# Patient Record
Sex: FEMALE | Race: BLACK OR AFRICAN AMERICAN | NOT HISPANIC OR LATINO | Employment: OTHER | URBAN - METROPOLITAN AREA
[De-identification: names, ages, dates, MRNs, and addresses within clinical notes are randomized per-mention and may not be internally consistent; named-entity substitution may affect disease eponyms.]

---

## 2018-03-20 ENCOUNTER — OFFICE VISIT (OUTPATIENT)
Dept: NEUROLOGY | Facility: CLINIC | Age: 73
End: 2018-03-20
Payer: MEDICARE

## 2018-03-20 ENCOUNTER — LAB VISIT (OUTPATIENT)
Dept: LAB | Facility: OTHER | Age: 73
End: 2018-03-20
Attending: PSYCHIATRY & NEUROLOGY
Payer: MEDICARE

## 2018-03-20 VITALS
SYSTOLIC BLOOD PRESSURE: 151 MMHG | WEIGHT: 136.88 LBS | BODY MASS INDEX: 21.48 KG/M2 | HEIGHT: 67 IN | DIASTOLIC BLOOD PRESSURE: 80 MMHG | HEART RATE: 74 BPM

## 2018-03-20 DIAGNOSIS — I10 ESSENTIAL HYPERTENSION: ICD-10-CM

## 2018-03-20 DIAGNOSIS — E78.5 HYPERLIPIDEMIA, UNSPECIFIED HYPERLIPIDEMIA TYPE: ICD-10-CM

## 2018-03-20 DIAGNOSIS — E07.9 THYROID DISEASE: ICD-10-CM

## 2018-03-20 DIAGNOSIS — R41.3 MEMORY LOSS: ICD-10-CM

## 2018-03-20 DIAGNOSIS — R41.3 MEMORY LOSS: Primary | ICD-10-CM

## 2018-03-20 DIAGNOSIS — F41.8 DEPRESSION WITH ANXIETY: ICD-10-CM

## 2018-03-20 LAB
FOLATE SERPL-MCNC: 13.2 NG/ML
RPR SER QL: NORMAL
VIT B12 SERPL-MCNC: 388 PG/ML

## 2018-03-20 PROCEDURE — 82607 VITAMIN B-12: CPT

## 2018-03-20 PROCEDURE — 3079F DIAST BP 80-89 MM HG: CPT | Mod: CPTII,S$GLB,, | Performed by: PSYCHIATRY & NEUROLOGY

## 2018-03-20 PROCEDURE — 3077F SYST BP >= 140 MM HG: CPT | Mod: CPTII,S$GLB,, | Performed by: PSYCHIATRY & NEUROLOGY

## 2018-03-20 PROCEDURE — 36415 COLL VENOUS BLD VENIPUNCTURE: CPT

## 2018-03-20 PROCEDURE — 99204 OFFICE O/P NEW MOD 45 MIN: CPT | Mod: S$GLB,,, | Performed by: PSYCHIATRY & NEUROLOGY

## 2018-03-20 PROCEDURE — 86592 SYPHILIS TEST NON-TREP QUAL: CPT

## 2018-03-20 PROCEDURE — 99999 PR PBB SHADOW E&M-EST. PATIENT-LVL IV: CPT | Mod: PBBFAC,,, | Performed by: PSYCHIATRY & NEUROLOGY

## 2018-03-20 PROCEDURE — 82746 ASSAY OF FOLIC ACID SERUM: CPT

## 2018-03-20 RX ORDER — ATORVASTATIN CALCIUM 10 MG/1
40 TABLET, FILM COATED ORAL
Status: ON HOLD | COMMUNITY
Start: 2018-01-26 | End: 2019-06-26 | Stop reason: HOSPADM

## 2018-03-20 RX ORDER — ERGOCALCIFEROL 1.25 MG/1
50000 CAPSULE ORAL
COMMUNITY
End: 2023-04-13

## 2018-03-20 RX ORDER — OMEPRAZOLE 40 MG/1
40 CAPSULE, DELAYED RELEASE ORAL DAILY
COMMUNITY
End: 2023-04-13

## 2018-03-20 RX ORDER — NAPROXEN 375 MG/1
TABLET ORAL
Status: ON HOLD | COMMUNITY
Start: 2018-01-26 | End: 2019-06-26 | Stop reason: HOSPADM

## 2018-03-20 RX ORDER — ASPIRIN 81 MG/1
81 TABLET ORAL DAILY
COMMUNITY

## 2018-03-20 RX ORDER — OMEGA-3 FATTY ACIDS 1000 MG
2 CAPSULE ORAL DAILY
COMMUNITY

## 2018-03-20 NOTE — LETTER
March 20, 2018      Velia Molina MD  [address]  [facility] [address]           [facility] - Neurology  [address]  [facility] [address]  Phone: [phone]  Fax: [phone]          Patient: Elda Cain   MR Number: [id number]   YOB: 1945   Date of Visit: 3/20/2018       Dear Dr. Velia Molina:    Thank you for referring Elda Cain to me for evaluation. Attached you will find relevant portions of my assessment and plan of care.    If you have questions, please do not hesitate to call me. I look forward to following Elda Cain along with you.    Sincerely,    Chidi Soriano MD    Enclosure  CC:  No Recipients    If you would like to receive this communication electronically, please contact [email or web address] or [phone] to request more information on [employer] access.    For providers and/or their staff who would like to refer a patient to [facility], please contact us through our one-stop-shop provider referral line, [facility], at [phone].    If you feel you have received this communication in error or would no longer like to receive these types of communications, please e-mail [email or web address]

## 2018-03-20 NOTE — PATIENT INSTRUCTIONS
Discussed with patient.  Her memory difficulties may be related to inattention however given her vascular risk factors of hypertension and hyperlipidemia the possibility of mild cognitive impairment on a vascular basis needs also to be considered.  Other contributing factors would include the history of depression and hypothyroidism.  Will get a B12/folate/RPR, MRI scan of the brain, noncontrast and neuropsychological testing.  Patient will follow-up after the testing is completed.

## 2018-03-20 NOTE — PROGRESS NOTES
Subjective:       Patient ID: Elda Cain is a 72 y.o. female.    Chief Complaint:  Memory Loss      History of Present Illness  HPI   This is a 72-year-old -American female who was referred for evaluation of intermittent memory difficulties.  The patient reports that over the past 6 months she has had occasional difficulty with recall of recent information, has become absent-minded and that she has left the refrigerator door open and gone into another room, had left her purse on the porch and entered the house without it.  She has occasional difficulty with word finding however does have delayed recall.  She does have history of anxiety reporting that she is a worrier and has occasional sleep difficulties.  She is presently on Lexapro for this.  She does not drive reporting that she used to be quite anxious when she used to drive when she was younger and her  did all the driving.  She is now a  and has not driven since her  .  She has no difficulty taking public transportation and her son helps her out with keeping appointments, when he is available.  She also reports that she has difficulty with multitasking and if she is on the phone and if interrupted loses track of what she was talking about.  She is otherwise no difficulty with her day-to-day activities including doing the groceries and managing her medications and finances.  She came to the clinic alone.  She has continued to work at [employer] as a .  She was concerned about her memory as her mother is in a nursing home in [place] with history of dementia.  She is in her 90s.  The patient denies any headaches, visual difficulties or hearing impairment.  She has had no history of any significant head trauma.  Vascular risk factors include hypertension and hyperlipidemia.  Her blood pressure medication was recently increased for better control.  Medical records from his primary care physician including labs were available  for review.  These are scanned into [employer].       Review of Systems  Review of Systems   Constitutional: Negative.    HENT: Negative.  Negative for hearing loss.    Eyes: Negative.  Negative for visual disturbance.   Respiratory: Negative.  Negative for shortness of breath.    Cardiovascular: Negative.  Negative for chest pain and palpitations.   Gastrointestinal: Negative.    Genitourinary: Negative.    Musculoskeletal: Negative.  Negative for back pain, gait problem and neck pain.   Skin: Negative.    Neurological: Negative.  Negative for tremors, seizures, syncope, speech difficulty, weakness, numbness and headaches.   Psychiatric/Behavioral: Positive for decreased concentration. Negative for confusion. The patient is nervous/anxious.        Objective:      Neurologic Exam     Mental Status   Oriented to person, place, and time.   Registration: recalls 3 of 3 objects. Recall at 5 minutes: recalls 2 of 3 objects. Follows 3 step commands.   Attention: normal. Concentration: normal.   Speech: speech is normal   Level of consciousness: alert  Knowledge: good.   Able to name object. Able to read. Able to repeat. Able to write. Normal comprehension.     Cranial Nerves   Cranial nerves II through XII intact.     Motor Exam   Muscle bulk: normal  Overall muscle tone: normal    Strength   Strength 5/5 throughout.     Sensory Exam   Light touch normal.   Proprioception normal.   Pinprick normal.     Gait, Coordination, and Reflexes     Gait  Gait: normal    Coordination   Romberg: negative  Finger to nose coordination: normal    Tremor   Resting tremor: absent  Intention tremor: absent  Action tremor: absent    Reflexes   Right brachioradialis: 1+  Left brachioradialis: 1+  Right biceps: 1+  Left biceps: 1+  Right triceps: 1+  Left triceps: 1+  Right patellar: 1+  Left patellar: 1+  Right achilles: 1+  Left achilles: 1+  Right plantar: normal  Left plantar: normal      Physical Exam   Constitutional: She is oriented to  person, place, and time. She appears well-developed and well-nourished.   HENT:   Head: Normocephalic and atraumatic.   Neck: Normal range of motion. Neck supple. Carotid bruit is not present.   Neurological: She is oriented to person, place, and time. She has normal strength. She has a normal Finger-Nose-Finger Test and a normal Romberg Test. Gait normal.   Reflex Scores:       Tricep reflexes are 1+ on the right side and 1+ on the left side.       Bicep reflexes are 1+ on the right side and 1+ on the left side.       Brachioradialis reflexes are 1+ on the right side and 1+ on the left side.       Patellar reflexes are 1+ on the right side and 1+ on the left side.       Achilles reflexes are 1+ on the right side and 1+ on the left side.  Psychiatric: Her speech is normal.   Vitals reviewed.        Assessment:        1. Memory loss    2. Essential hypertension    3. Hyperlipidemia, unspecified hyperlipidemia type    4. Thyroid disease    5. Depression with anxiety            Plan:       Discussed with patient.  Her memory difficulties may be related to inattention however given her vascular risk factors of hypertension and hyperlipidemia the possibility of mild cognitive impairment on a vascular basis needs also to be considered.  Other contributing factors would include the history of depression and hypothyroidism.  Will get a B12/folate/RPR, MRI scan of the brain, noncontrast and neuropsychological testing.  Patient will follow-up after the testing is completed.

## 2018-03-21 ENCOUNTER — HOSPITAL ENCOUNTER (OUTPATIENT)
Dept: RADIOLOGY | Facility: OTHER | Age: 73
Discharge: HOME | End: 2018-03-21
Attending: PSYCHIATRY & NEUROLOGY
Payer: MEDICARE

## 2018-03-21 DIAGNOSIS — E78.5 HYPERLIPIDEMIA, UNSPECIFIED HYPERLIPIDEMIA TYPE: ICD-10-CM

## 2018-03-21 DIAGNOSIS — R41.3 MEMORY LOSS: ICD-10-CM

## 2018-03-21 DIAGNOSIS — E07.9 THYROID DISEASE: ICD-10-CM

## 2018-03-21 DIAGNOSIS — I10 ESSENTIAL HYPERTENSION: ICD-10-CM

## 2018-03-21 PROCEDURE — 70551 MRI BRAIN STEM W/O DYE: CPT | Mod: 26,,, | Performed by: RADIOLOGY

## 2018-03-21 PROCEDURE — 70551 MRI BRAIN STEM W/O DYE: CPT | Mod: TC

## 2019-03-20 ENCOUNTER — OFFICE VISIT (OUTPATIENT)
Dept: URGENT CARE | Facility: CLINIC | Age: 74
End: 2019-03-20
Payer: MEDICARE

## 2019-03-20 VITALS
HEIGHT: 65 IN | HEART RATE: 82 BPM | TEMPERATURE: 98 F | BODY MASS INDEX: 22.33 KG/M2 | DIASTOLIC BLOOD PRESSURE: 78 MMHG | WEIGHT: 134 LBS | OXYGEN SATURATION: 97 % | SYSTOLIC BLOOD PRESSURE: 138 MMHG | RESPIRATION RATE: 20 BRPM

## 2019-03-20 DIAGNOSIS — J02.9 SORE THROAT: Primary | ICD-10-CM

## 2019-03-20 LAB
CTP QC/QA: YES
GLUCOSE SERPL-MCNC: 104 MG/DL (ref 70–110)
POC ANION GAP: 9 MMOL/L (ref 10–20)
POC BUN: 15 MMOL/L (ref 8–26)
POC CHLORIDE: 103 MMOL/L (ref 98–109)
POC CREATININE: 1.2 MG/DL (ref 0.6–1.3)
POC HEMATOCRIT: 39 %PCV (ref 37–47)
POC HEMOGLOBIN: 13.3 G/DL (ref 12.5–16)
POC ICA: 1.09 MMOL/L (ref 1.12–1.32)
POC POTASSIUM: 4 MMOL/L (ref 3.5–4.9)
POC SODIUM: 140 MMOL/L (ref 138–146)
POC TCO2: 33 MMOL/L (ref 24–29)
S PYO RRNA THROAT QL PROBE: POSITIVE

## 2019-03-20 PROCEDURE — 1101F PR PT FALLS ASSESS DOC 0-1 FALLS W/OUT INJ PAST YR: ICD-10-PCS | Mod: CPTII,S$GLB,, | Performed by: PHYSICIAN ASSISTANT

## 2019-03-20 PROCEDURE — 80047 POCT CHEMISTRY PANEL: ICD-10-PCS | Mod: QW,S$GLB,, | Performed by: PHYSICIAN ASSISTANT

## 2019-03-20 PROCEDURE — 3075F SYST BP GE 130 - 139MM HG: CPT | Mod: CPTII,S$GLB,, | Performed by: PHYSICIAN ASSISTANT

## 2019-03-20 PROCEDURE — 96372 PR INJECTION,THERAP/PROPH/DIAG2ST, IM OR SUBCUT: ICD-10-PCS | Mod: S$GLB,,, | Performed by: PHYSICIAN ASSISTANT

## 2019-03-20 PROCEDURE — 96372 THER/PROPH/DIAG INJ SC/IM: CPT | Mod: S$GLB,,, | Performed by: PHYSICIAN ASSISTANT

## 2019-03-20 PROCEDURE — 1101F PT FALLS ASSESS-DOCD LE1/YR: CPT | Mod: CPTII,S$GLB,, | Performed by: PHYSICIAN ASSISTANT

## 2019-03-20 PROCEDURE — 99214 OFFICE O/P EST MOD 30 MIN: CPT | Mod: 25,S$GLB,, | Performed by: PHYSICIAN ASSISTANT

## 2019-03-20 PROCEDURE — 3078F PR MOST RECENT DIASTOLIC BLOOD PRESSURE < 80 MM HG: ICD-10-PCS | Mod: CPTII,S$GLB,, | Performed by: PHYSICIAN ASSISTANT

## 2019-03-20 PROCEDURE — 3075F PR MOST RECENT SYSTOLIC BLOOD PRESS GE 130-139MM HG: ICD-10-PCS | Mod: CPTII,S$GLB,, | Performed by: PHYSICIAN ASSISTANT

## 2019-03-20 PROCEDURE — 87880 STREP A ASSAY W/OPTIC: CPT | Mod: QW,S$GLB,, | Performed by: PHYSICIAN ASSISTANT

## 2019-03-20 PROCEDURE — 99214 PR OFFICE/OUTPT VISIT, EST, LEVL IV, 30-39 MIN: ICD-10-PCS | Mod: 25,S$GLB,, | Performed by: PHYSICIAN ASSISTANT

## 2019-03-20 PROCEDURE — 80047 BASIC METABLC PNL IONIZED CA: CPT | Mod: QW,S$GLB,, | Performed by: PHYSICIAN ASSISTANT

## 2019-03-20 PROCEDURE — 3078F DIAST BP <80 MM HG: CPT | Mod: CPTII,S$GLB,, | Performed by: PHYSICIAN ASSISTANT

## 2019-03-20 PROCEDURE — 87880 POCT RAPID STREP A: ICD-10-PCS | Mod: QW,S$GLB,, | Performed by: PHYSICIAN ASSISTANT

## 2019-03-20 RX ORDER — AZELASTINE 1 MG/ML
2 SPRAY, METERED NASAL 2 TIMES DAILY
Qty: 30 ML | Refills: 0 | Status: SHIPPED | OUTPATIENT
Start: 2019-03-20 | End: 2023-04-13

## 2019-03-20 RX ORDER — AMOXICILLIN 500 MG/1
500 CAPSULE ORAL 2 TIMES DAILY
Qty: 20 CAPSULE | Refills: 0 | Status: SHIPPED | OUTPATIENT
Start: 2019-03-20 | End: 2019-03-30

## 2019-03-20 RX ORDER — DEXAMETHASONE SODIUM PHOSPHATE 100 MG/10ML
5 INJECTION INTRAMUSCULAR; INTRAVENOUS ONCE
Status: COMPLETED | OUTPATIENT
Start: 2019-03-20 | End: 2019-03-20

## 2019-03-20 RX ADMIN — DEXAMETHASONE SODIUM PHOSPHATE 5 MG: 100 INJECTION INTRAMUSCULAR; INTRAVENOUS at 05:03

## 2019-03-20 NOTE — PROGRESS NOTES
"Subjective:       Patient ID: Elda Cain is a 73 y.o. female.    Vitals:  height is 5' 4.5" (1.638 m) and weight is 60.8 kg (134 lb). Her oral temperature is 98 °F (36.7 °C). Her blood pressure is 138/78 and her pulse is 82. Her respiration is 20 and oxygen saturation is 97%.     Chief Complaint: Sore Throat and Neck Pain    Sore Throat    This is a new problem. The current episode started in the past 7 days. The problem has been unchanged. The pain is worse on the left side. There has been no fever. The fever has been present for less than 1 day. The pain is at a severity of 8/10. The pain is severe. Associated symptoms include ear pain and swollen glands. Pertinent negatives include no congestion, coughing, shortness of breath, stridor or vomiting. She has had exposure to strep. Exposure to: Grandson. She has tried NSAIDs (Clartin 24hr) for the symptoms. The treatment provided no relief.       Constitution: Negative for chills and sweating.   HENT: Positive for ear pain and sore throat. Negative for congestion, sinus pain, sinus pressure and voice change.    Neck: Positive for painful lymph nodes.   Eyes: Negative for eye redness.   Respiratory: Negative for chest tightness, cough, sputum production, bloody sputum, COPD, shortness of breath, stridor, wheezing and asthma.    Gastrointestinal: Negative for nausea and vomiting.   Musculoskeletal: Negative for muscle ache.   Skin: Negative for rash.   Allergic/Immunologic: Positive for seasonal allergies. Negative for asthma.   Hematologic/Lymphatic: Positive for swollen lymph nodes.       Objective:      Physical Exam   Constitutional: She is oriented to person, place, and time. She appears well-developed and well-nourished. She is cooperative.  Non-toxic appearance. She does not appear ill. No distress.   HENT:   Head: Normocephalic and atraumatic.   Right Ear: Hearing, tympanic membrane, external ear and ear canal normal.   Left Ear: Hearing, tympanic " membrane, external ear and ear canal normal.   Nose: Nose normal. No mucosal edema, rhinorrhea or nasal deformity. No epistaxis. Right sinus exhibits no maxillary sinus tenderness and no frontal sinus tenderness. Left sinus exhibits no maxillary sinus tenderness and no frontal sinus tenderness.   Mouth/Throat: Uvula is midline and mucous membranes are normal. No trismus in the jaw. Normal dentition. No uvula swelling. No posterior oropharyngeal erythema.   Posterior or pharyngeal erythema.  No significant tonsillar swelling or asymmetry. No uvular deviation.  Airway patent without stridor.  There is tender bilateral anterior cervical lymphadenopathy.  Neck remains supple. Normal phonation.   Eyes: Conjunctivae and lids are normal. Right eye exhibits no discharge. Left eye exhibits no discharge. No scleral icterus.   Sclera clear bilat   Neck: Trachea normal, normal range of motion, full passive range of motion without pain and phonation normal. Neck supple.   Cardiovascular: Normal rate, regular rhythm, normal heart sounds, intact distal pulses and normal pulses.   Pulmonary/Chest: Effort normal and breath sounds normal. No respiratory distress.   Abdominal: Soft. Normal appearance and bowel sounds are normal. She exhibits no distension, no pulsatile midline mass and no mass. There is no tenderness.   Musculoskeletal: Normal range of motion. She exhibits no edema or deformity.   Neurological: She is alert and oriented to person, place, and time. She exhibits normal muscle tone. Coordination normal.   Skin: Skin is warm, dry and intact. She is not diaphoretic. No pallor.   Psychiatric: She has a normal mood and affect. Her speech is normal and behavior is normal. Judgment and thought content normal. Cognition and memory are normal.   Nursing note and vitals reviewed.      Assessment:       1. Sore throat        Plan:         Strep pharyngitis.  Ensure normal kidney function.  Amoxicillin on D/C. GFR 56.7.    Sore  throat  -     POCT rapid strep A  -     POCT Chemistry Panel    Other orders  -     dexamethasone injection 5 mg  -     amoxicillin (AMOXIL) 500 MG capsule; Take 1 capsule (500 mg total) by mouth 2 (two) times daily. for 10 days  Dispense: 20 capsule; Refill: 0  -     diphenhydrAMINE-aluminum-magnesium hydroxide-simethicone-lidocaine HCl 2%; Swish and spit 10 mLs every 4 (four) hours as needed (throat pain).  Dispense: 120 mL; Refill: 0  -     azelastine (ASTELIN) 137 mcg (0.1 %) nasal spray; 2 sprays (274 mcg total) by Nasal route 2 (two) times daily.  Dispense: 30 mL; Refill: 0

## 2019-03-20 NOTE — PATIENT INSTRUCTIONS
Drink lots of fluids, stay well hydrated. Tylenol/Ibuprofen as needed for discomfort; go back and forth between these two medications as needed for temp greater than 100.4F. Zyrtec daily. Astelin for congestion. Take all antibiotics as prescribed. Try to take with meals to limit nausea.   Follow-up with primary care provider for reevaluation, further recommendations. Return to this ED if unable to treat fever, if symptoms persist or worsen despite treatment, if you begin with shortness of breath or difficulty breathing, if any other problems occur.   Self-Care for Sore Throats    Sore throats happen for many reasons, such as colds, allergies, and infections caused by viruses or bacteria. In any case, your throat becomes red and sore. Your goal for self-care is to reduce your discomfort while giving your throat a chance to heal.  Moisten and soothe your throat  Tips include the following:  · Try a sip of water first thing after waking up.  · Keep your throat moist by drinking 6 or more glasses of clear liquids every day.  · Run a cool-air humidifier in your room overnight.  · Avoid cigarette smoke.   · Suck on throat lozenges, cough drops, hard candy, ice chips, or frozen fruit-juice bars. Use the sugar-free versions if your diet or medical condition requires them.  Gargle to ease irritation  Gargling every hour or 2 can ease irritation. Try gargling with 1 of these solutions:  · 1/4 teaspoon of salt in 1/2 cup of warm water  · An over-the-counter anesthetic gargle  Use medicine for more relief  Over-the-counter medicine can reduce sore throat symptoms. Ask your pharmacist if you have questions about which medicine to use:  · Ease pain with anesthetic sprays. Aspirin or an aspirin substitute also helps. Remember, never give aspirin to anyone 18 or younger, or if you are already taking blood thinners.   · For sore throats caused by allergies, try antihistamines to block the allergic reaction.  · Remember: unless a  sore throat is caused by a bacterial infection, antibiotics wont help you.  Prevent future sore throats  Prevention tips include the following:  · Stop smoking or reduce contact with secondhand smoke. Smoke irritates the tender throat lining.  · Limit contact with pets and with allergy-causing substances, such as pollen and mold.  · When youre around someone with a sore throat or cold, wash your hands often to keep viruses or bacteria from spreading.  · Dont strain your vocal cords.  Call your healthcare provider  Contact your healthcare provider if you have:  · A temperature over 101°F (38.3°C)  · White spots on the throat  · Great difficulty swallowing  · Trouble breathing  · A skin rash  · Recent exposure to someone else with strep bacteria  · Severe hoarseness and swollen glands in the neck or jaw   Date Last Reviewed: 8/1/2016  © 9406-6789 [employer]. [address] [place] [address]. All rights reserved. This information is not intended as a substitute for professional medical care. Always follow your healthcare professional's instructions.        Viral Upper Respiratory Illness (Adult)  You have a viral upper respiratory illness (URI), which is another term for the common cold. This illness is contagious during the first few days. It is spread through the air by coughing and sneezing. It may also be spread by direct contact (touching the sick person and then touching your own eyes, nose, or mouth). Frequent handwashing will decrease risk of spread. Most viral illnesses go away within 7 to 10 days with rest and simple home remedies. Sometimes the illness may last for several weeks. Antibiotics will not kill a virus, and they are generally not prescribed for this condition.    Home care  · If symptoms are severe, rest at home for the first 2 to 3 days. When you resume activity, don't let yourself get too tired.  · Avoid being exposed to cigarette smoke (yours or others).  · You may  use acetaminophen or ibuprofen to control pain and fever, unless another medicine was prescribed. (Note: If you have chronic liver or kidney disease, have ever had a stomach ulcer or gastrointestinal bleeding, or are taking blood-thinning medicines, talk with your healthcare provider before using these medicines.) Aspirin should never be given to anyone under 18 years of age who is ill with a viral infection or fever. It may cause severe liver or brain damage.  · Your appetite may be poor, so a light diet is fine. Avoid dehydration by drinking 6 to 8 glasses of fluids per day (water, soft drinks, juices, tea, or soup). Extra fluids will help loosen secretions in the nose and lungs.  · Over-the-counter cold medicines will not shorten the length of time youre sick, but they may be helpful for the following symptoms: cough, sore throat, and nasal and sinus congestion. (Note: Do not use decongestants if you have high blood pressure.)  Follow-up care  Follow up with your healthcare provider, or as advised.  When to seek medical advice  Call your healthcare provider right away if any of these occur:  · Cough with lots of colored sputum (mucus)  · Severe headache; face, neck, or ear pain  · Difficulty swallowing due to throat pain  · Fever of 100.4°F (38°C)  Call 911, or get immediate medical care  Call emergency services right away if any of these occur:  · Chest pain, shortness of breath, wheezing, or difficulty breathing  · Coughing up blood  · Inability to swallow due to throat pain  Date Last Reviewed: 9/13/2015  © 1858-9070 [employer]. [address] [place] [address], [place] [address]. All rights reserved. This information is not intended as a substitute for professional medical care. Always follow your healthcare professional's instructions.        Pharyngitis: Strep (Confirmed)    You have had a positive test for strep throat. Strep throat is a contagious illness. It is spread by coughing, kissing or by  touching others after touching your mouth or nose. Symptoms include throat pain that is worse with swallowing, aching all over, headache, and fever. It is treated with antibiotic medicine. This should help you start to feel better in 1 to 2 days.  Home care  · Rest at home. Drink plenty of fluids to you won't get dehydrated.  · No work or school for the first 2 days of taking the antibiotics. After this time, you will not be contagious. You can then return to school or work if you are feeling better.   · Take antibiotic medicine for the full 10 days, even if you feel better. This is very important to ensure the infection is treated. It is also important to prevent medicine-resistant germs from developing. If you were given an antibiotic shot, you don't need any more antibiotics.  · You may use acetaminophen or ibuprofen to control pain or fever, unless another medicine was prescribed for this. Talk with your doctor before taking these medicines if you have chronic liver or kidney disease. Also talk with your doctor if you have had a stomach ulcer or GI bleeding.  · Throat lozenges or sprays help reduce pain. Gargling with warm saltwater will also reduce throat pain. Dissolve 1/2 teaspoon of salt in 1 glass of warm water. This may be useful just before meals.   · Soft foods are OK. Avoid salty or spicy foods.  Follow-up care  Follow up with your healthcare provider or our staff if you don't get better over the next week.  When to seek medical advice  Call your healthcare provider right away if any of these occur:  · Fever of 100.4ºF (38ºC) or higher, or as directed by your healthcare provider  · New or worsening ear pain, sinus pain, or headache  · Painful lumps in the back of neck  · Stiff neck  · Lymph nodes getting larger or becoming soft in the middle  · You can't swallow liquids or you can't open your mouth wide because of throat pain  · Signs of dehydration. These include very dark urine or no urine, sunken  eyes, and dizziness.  · Trouble breathing or noisy breathing  · Muffled voice  · Rash  Date Last Reviewed: 4/13/2015  © 8064-1199 [employer]. [address] [place] [address], [place] [address]. All rights reserved. This information is not intended as a substitute for professional medical care. Always follow your healthcare professional's instructions.

## 2019-06-25 ENCOUNTER — HOSPITAL ENCOUNTER (OUTPATIENT)
Facility: HOSPITAL | Age: 74
Discharge: HOME | End: 2019-06-26
Attending: EMERGENCY MEDICINE | Admitting: PSYCHIATRY & NEUROLOGY
Payer: MEDICARE

## 2019-06-25 DIAGNOSIS — I16.0 HYPERTENSIVE URGENCY: ICD-10-CM

## 2019-06-25 DIAGNOSIS — R29.898 LEFT ARM WEAKNESS: Primary | ICD-10-CM

## 2019-06-25 DIAGNOSIS — G45.8 OTHER SPECIFIED TRANSIENT CEREBRAL ISCHEMIAS: ICD-10-CM

## 2019-06-25 DIAGNOSIS — R20.0 NUMBNESS OF TONGUE: ICD-10-CM

## 2019-06-25 PROBLEM — G45.9 TIA (TRANSIENT ISCHEMIC ATTACK): Status: ACTIVE | Noted: 2019-06-25

## 2019-06-25 LAB
ALBUMIN SERPL BCP-MCNC: 3.9 G/DL (ref 3.5–5.2)
ALP SERPL-CCNC: 113 U/L (ref 55–135)
ALT SERPL W/O P-5'-P-CCNC: 15 U/L (ref 10–44)
ANION GAP SERPL CALC-SCNC: 11 MMOL/L (ref 8–16)
AST SERPL-CCNC: 21 U/L (ref 10–40)
BASOPHILS # BLD AUTO: 0.09 K/UL (ref 0–0.2)
BASOPHILS NFR BLD: 1 % (ref 0–1.9)
BILIRUB SERPL-MCNC: 0.2 MG/DL (ref 0.1–1)
BUN SERPL-MCNC: 16 MG/DL (ref 8–23)
CALCIUM SERPL-MCNC: 9.7 MG/DL (ref 8.7–10.5)
CHLORIDE SERPL-SCNC: 106 MMOL/L (ref 95–110)
CO2 SERPL-SCNC: 22 MMOL/L (ref 23–29)
CREAT SERPL-MCNC: 1 MG/DL (ref 0.5–1.4)
DIFFERENTIAL METHOD: ABNORMAL
EOSINOPHIL # BLD AUTO: 0.6 K/UL (ref 0–0.5)
EOSINOPHIL NFR BLD: 6.3 % (ref 0–8)
ERYTHROCYTE [DISTWIDTH] IN BLOOD BY AUTOMATED COUNT: 14.3 % (ref 11.5–14.5)
EST. GFR  (AFRICAN AMERICAN): >60 ML/MIN/1.73 M^2
EST. GFR  (NON AFRICAN AMERICAN): 56 ML/MIN/1.73 M^2
GLUCOSE SERPL-MCNC: 105 MG/DL (ref 70–110)
HCT VFR BLD AUTO: 39.3 % (ref 37–48.5)
HGB BLD-MCNC: 12.8 G/DL (ref 12–16)
IMM GRANULOCYTES # BLD AUTO: 0.02 K/UL (ref 0–0.04)
IMM GRANULOCYTES NFR BLD AUTO: 0.2 % (ref 0–0.5)
LYMPHOCYTES # BLD AUTO: 3.1 K/UL (ref 1–4.8)
LYMPHOCYTES NFR BLD: 35.2 % (ref 18–48)
MAGNESIUM SERPL-MCNC: 1.7 MG/DL (ref 1.6–2.6)
MCH RBC QN AUTO: 30.2 PG (ref 27–31)
MCHC RBC AUTO-ENTMCNC: 32.6 G/DL (ref 32–36)
MCV RBC AUTO: 93 FL (ref 82–98)
MONOCYTES # BLD AUTO: 0.7 K/UL (ref 0.3–1)
MONOCYTES NFR BLD: 7.6 % (ref 4–15)
NEUTROPHILS # BLD AUTO: 4.3 K/UL (ref 1.8–7.7)
NEUTROPHILS NFR BLD: 49.7 % (ref 38–73)
NRBC BLD-RTO: 0 /100 WBC
PLATELET # BLD AUTO: 223 K/UL (ref 150–350)
PMV BLD AUTO: 10.4 FL (ref 9.2–12.9)
POCT GLUCOSE: 149 MG/DL (ref 70–110)
POTASSIUM SERPL-SCNC: 3.7 MMOL/L (ref 3.5–5.1)
PROT SERPL-MCNC: 8.2 G/DL (ref 6–8.4)
RBC # BLD AUTO: 4.24 M/UL (ref 4–5.4)
SODIUM SERPL-SCNC: 139 MMOL/L (ref 136–145)
TROPONIN I SERPL DL<=0.01 NG/ML-MCNC: <0.006 NG/ML (ref 0–0.03)
TSH SERPL DL<=0.005 MIU/L-ACNC: 3.23 UIU/ML (ref 0.4–4)
WBC # BLD AUTO: 8.73 K/UL (ref 3.9–12.7)

## 2019-06-25 PROCEDURE — 80061 LIPID PANEL: CPT

## 2019-06-25 PROCEDURE — 84443 ASSAY THYROID STIM HORMONE: CPT

## 2019-06-25 PROCEDURE — 83036 HEMOGLOBIN GLYCOSYLATED A1C: CPT

## 2019-06-25 PROCEDURE — G0378 HOSPITAL OBSERVATION PER HR: HCPCS

## 2019-06-25 PROCEDURE — 82553 CREATINE MB FRACTION: CPT

## 2019-06-25 PROCEDURE — 85730 THROMBOPLASTIN TIME PARTIAL: CPT

## 2019-06-25 PROCEDURE — 96374 THER/PROPH/DIAG INJ IV PUSH: CPT

## 2019-06-25 PROCEDURE — 63600175 PHARM REV CODE 636 W HCPCS: Performed by: EMERGENCY MEDICINE

## 2019-06-25 PROCEDURE — 83735 ASSAY OF MAGNESIUM: CPT | Mod: 91

## 2019-06-25 PROCEDURE — 84484 ASSAY OF TROPONIN QUANT: CPT

## 2019-06-25 PROCEDURE — 85025 COMPLETE CBC W/AUTO DIFF WBC: CPT

## 2019-06-25 PROCEDURE — 93010 ELECTROCARDIOGRAM REPORT: CPT | Mod: ,,, | Performed by: INTERNAL MEDICINE

## 2019-06-25 PROCEDURE — 82962 GLUCOSE BLOOD TEST: CPT

## 2019-06-25 PROCEDURE — 93005 ELECTROCARDIOGRAM TRACING: CPT

## 2019-06-25 PROCEDURE — 83735 ASSAY OF MAGNESIUM: CPT

## 2019-06-25 PROCEDURE — 80053 COMPREHEN METABOLIC PANEL: CPT

## 2019-06-25 PROCEDURE — 93010 EKG 12-LEAD: ICD-10-PCS | Mod: ,,, | Performed by: INTERNAL MEDICINE

## 2019-06-25 PROCEDURE — 99285 PR EMERGENCY DEPT VISIT,LEVEL V: ICD-10-PCS | Mod: ,,, | Performed by: EMERGENCY MEDICINE

## 2019-06-25 PROCEDURE — 84100 ASSAY OF PHOSPHORUS: CPT

## 2019-06-25 PROCEDURE — 82550 ASSAY OF CK (CPK): CPT

## 2019-06-25 PROCEDURE — 85610 PROTHROMBIN TIME: CPT

## 2019-06-25 PROCEDURE — 99285 EMERGENCY DEPT VISIT HI MDM: CPT | Mod: ,,, | Performed by: EMERGENCY MEDICINE

## 2019-06-25 PROCEDURE — 99285 EMERGENCY DEPT VISIT HI MDM: CPT | Mod: 25

## 2019-06-25 RX ORDER — LOSARTAN POTASSIUM 50 MG/1
100 TABLET ORAL DAILY
Status: DISCONTINUED | OUTPATIENT
Start: 2019-06-26 | End: 2019-06-26 | Stop reason: HOSPADM

## 2019-06-25 RX ORDER — AMOXICILLIN 250 MG
1 CAPSULE ORAL DAILY PRN
Status: DISCONTINUED | OUTPATIENT
Start: 2019-06-26 | End: 2019-06-26 | Stop reason: HOSPADM

## 2019-06-25 RX ORDER — LABETALOL HCL 20 MG/4 ML
10 SYRINGE (ML) INTRAVENOUS EVERY 6 HOURS PRN
Status: DISCONTINUED | OUTPATIENT
Start: 2019-06-26 | End: 2019-06-26 | Stop reason: HOSPADM

## 2019-06-25 RX ORDER — LOSARTAN POTASSIUM 100 MG/1
100 TABLET ORAL DAILY
COMMUNITY

## 2019-06-25 RX ORDER — HYDRALAZINE HYDROCHLORIDE 20 MG/ML
10 INJECTION INTRAMUSCULAR; INTRAVENOUS
Status: COMPLETED | OUTPATIENT
Start: 2019-06-25 | End: 2019-06-25

## 2019-06-25 RX ORDER — LABETALOL HCL 20 MG/4 ML
10 SYRINGE (ML) INTRAVENOUS EVERY 6 HOURS PRN
Status: DISCONTINUED | OUTPATIENT
Start: 2019-06-26 | End: 2019-06-25

## 2019-06-25 RX ORDER — ATORVASTATIN CALCIUM 20 MG/1
40 TABLET, FILM COATED ORAL DAILY
Status: DISCONTINUED | OUTPATIENT
Start: 2019-06-26 | End: 2019-06-26

## 2019-06-25 RX ORDER — SODIUM CHLORIDE 0.9 % (FLUSH) 0.9 %
10 SYRINGE (ML) INJECTION
Status: DISCONTINUED | OUTPATIENT
Start: 2019-06-26 | End: 2019-06-26 | Stop reason: HOSPADM

## 2019-06-25 RX ORDER — ESCITALOPRAM OXALATE 20 MG/1
20 TABLET ORAL DAILY
Status: DISCONTINUED | OUTPATIENT
Start: 2019-06-26 | End: 2019-06-26 | Stop reason: HOSPADM

## 2019-06-25 RX ORDER — ASPIRIN 81 MG/1
81 TABLET ORAL DAILY
Status: DISCONTINUED | OUTPATIENT
Start: 2019-06-26 | End: 2019-06-26 | Stop reason: HOSPADM

## 2019-06-25 RX ADMIN — HYDRALAZINE HYDROCHLORIDE 10 MG: 20 INJECTION INTRAMUSCULAR; INTRAVENOUS at 09:06

## 2019-06-25 NOTE — LETTER
June 26, 2019    {enter recipient's name}  {enter recipient's address}             [facility]  [facility] [place]  [address]  Phone: [phone]  Fax: [phone] June 26, 2019     Patient: Elda Cain   YOB: 1945       To Whom It May Concern:    Elda Cain was admitted to the hospital on 6/25/2019  8:26 PM and discharged on 06/26/2019 .  She may return to work on 06/28/2019.  If you have any questions or concerns, please don't hesitate to call Daina Perea NP at [phone]      Sincerely,        Diana Perea NP  Department of Hospital Medicine

## 2019-06-26 VITALS
OXYGEN SATURATION: 97 % | SYSTOLIC BLOOD PRESSURE: 152 MMHG | RESPIRATION RATE: 17 BRPM | BODY MASS INDEX: 23.32 KG/M2 | DIASTOLIC BLOOD PRESSURE: 70 MMHG | TEMPERATURE: 98 F | HEART RATE: 61 BPM | WEIGHT: 140 LBS | HEIGHT: 65 IN

## 2019-06-26 PROBLEM — K63.5 COLON POLYP: Status: ACTIVE | Noted: 2019-01-31

## 2019-06-26 PROBLEM — E03.9 HYPOTHYROIDISM: Status: ACTIVE | Noted: 2017-08-07

## 2019-06-26 PROBLEM — E55.9 VITAMIN D DEFICIENCY, UNSPECIFIED: Status: ACTIVE | Noted: 2017-08-07

## 2019-06-26 PROBLEM — K21.9 GASTRO-ESOPHAGEAL REFLUX DISEASE WITHOUT ESOPHAGITIS: Status: ACTIVE | Noted: 2017-08-07

## 2019-06-26 LAB
ALBUMIN SERPL BCP-MCNC: 3.5 G/DL (ref 3.5–5.2)
ALP SERPL-CCNC: 91 U/L (ref 55–135)
ALT SERPL W/O P-5'-P-CCNC: 13 U/L (ref 10–44)
ANION GAP SERPL CALC-SCNC: 8 MMOL/L (ref 8–16)
APTT BLDCRRT: 26.1 SEC (ref 21–32)
ASCENDING AORTA: 2.89 CM
AST SERPL-CCNC: 18 U/L (ref 10–40)
AV INDEX (PROSTH): 1.02
AV MEAN GRADIENT: 3 MMHG
AV PEAK GRADIENT: 7 MMHG
AV VALVE AREA: 4.35 CM2
AV VELOCITY RATIO: 0.94
BACTERIA #/AREA URNS AUTO: NORMAL /HPF
BASOPHILS # BLD AUTO: 0.08 K/UL (ref 0–0.2)
BASOPHILS NFR BLD: 1.1 % (ref 0–1.9)
BILIRUB SERPL-MCNC: 0.4 MG/DL (ref 0.1–1)
BILIRUB UR QL STRIP: NEGATIVE
BSA FOR ECHO PROCEDURE: 1.71 M2
BUN SERPL-MCNC: 13 MG/DL (ref 8–23)
CALCIUM SERPL-MCNC: 9.4 MG/DL (ref 8.7–10.5)
CHLORIDE SERPL-SCNC: 107 MMOL/L (ref 95–110)
CHOLEST SERPL-MCNC: 156 MG/DL (ref 120–199)
CHOLEST/HDLC SERPL: 4.9 {RATIO} (ref 2–5)
CK MB SERPL-MCNC: 3.1 NG/ML (ref 0.1–6.5)
CK MB SERPL-RTO: 1.4 % (ref 0–5)
CK SERPL-CCNC: 225 U/L (ref 20–180)
CLARITY UR REFRACT.AUTO: CLEAR
CO2 SERPL-SCNC: 22 MMOL/L (ref 23–29)
COLOR UR AUTO: ABNORMAL
CREAT SERPL-MCNC: 0.8 MG/DL (ref 0.5–1.4)
CV ECHO LV RWT: 0.71 CM
DIFFERENTIAL METHOD: NORMAL
DOP CALC AO PEAK VEL: 1.29 M/S
DOP CALC AO VTI: 21.45 CM
DOP CALC LVOT AREA: 4.3 CM2
DOP CALC LVOT DIAMETER: 2.33 CM
DOP CALC LVOT PEAK VEL: 1.21 M/S
DOP CALC LVOT STROKE VOLUME: 93.37 CM3
DOP CALCLVOT PEAK VEL VTI: 21.91 CM
E WAVE DECELERATION TIME: 264.41 MSEC
E/A RATIO: 0.84
E/E' RATIO: 14.8 M/S
ECHO LV POSTERIOR WALL: 1.22 CM (ref 0.6–1.1)
EOSINOPHIL # BLD AUTO: 0.5 K/UL (ref 0–0.5)
EOSINOPHIL NFR BLD: 7 % (ref 0–8)
ERYTHROCYTE [DISTWIDTH] IN BLOOD BY AUTOMATED COUNT: 14.2 % (ref 11.5–14.5)
EST. GFR  (AFRICAN AMERICAN): >60 ML/MIN/1.73 M^2
EST. GFR  (NON AFRICAN AMERICAN): >60 ML/MIN/1.73 M^2
ESTIMATED AVG GLUCOSE: 111 MG/DL (ref 68–131)
FRACTIONAL SHORTENING: 37 % (ref 28–44)
GLUCOSE SERPL-MCNC: 90 MG/DL (ref 70–110)
GLUCOSE UR QL STRIP: NEGATIVE
HBA1C MFR BLD HPLC: 5.5 % (ref 4–5.6)
HCT VFR BLD AUTO: 38.7 % (ref 37–48.5)
HDLC SERPL-MCNC: 32 MG/DL (ref 40–75)
HDLC SERPL: 20.5 % (ref 20–50)
HGB BLD-MCNC: 12.7 G/DL (ref 12–16)
HGB UR QL STRIP: NEGATIVE
IMM GRANULOCYTES # BLD AUTO: 0.02 K/UL (ref 0–0.04)
IMM GRANULOCYTES NFR BLD AUTO: 0.3 % (ref 0–0.5)
INR PPP: 1 (ref 0.8–1.2)
INTERVENTRICULAR SEPTUM: 1.12 CM (ref 0.6–1.1)
KETONES UR QL STRIP: NEGATIVE
LA MAJOR: 4.21 CM
LA MINOR: 4.23 CM
LA WIDTH: 3.14 CM
LDLC SERPL CALC-MCNC: 56.2 MG/DL (ref 63–159)
LEFT ATRIUM SIZE: 3.76 CM
LEFT ATRIUM VOLUME INDEX: 24.9 ML/M2
LEFT ATRIUM VOLUME: 42.35 CM3
LEFT INTERNAL DIMENSION IN SYSTOLE: 2.14 CM (ref 2.1–4)
LEFT VENTRICLE DIASTOLIC VOLUME INDEX: 28.38 ML/M2
LEFT VENTRICLE DIASTOLIC VOLUME: 48.25 ML
LEFT VENTRICLE MASS INDEX: 74 G/M2
LEFT VENTRICLE SYSTOLIC VOLUME INDEX: 8.8 ML/M2
LEFT VENTRICLE SYSTOLIC VOLUME: 15.03 ML
LEFT VENTRICULAR INTERNAL DIMENSION IN DIASTOLE: 3.42 CM (ref 3.5–6)
LEFT VENTRICULAR MASS: 126.32 G
LEUKOCYTE ESTERASE UR QL STRIP: ABNORMAL
LV LATERAL E/E' RATIO: 7.4 M/S
LYMPHOCYTES # BLD AUTO: 2.6 K/UL (ref 1–4.8)
LYMPHOCYTES NFR BLD: 35.3 % (ref 18–48)
MAGNESIUM SERPL-MCNC: 1.8 MG/DL (ref 1.6–2.6)
MCH RBC QN AUTO: 30.2 PG (ref 27–31)
MCHC RBC AUTO-ENTMCNC: 32.8 G/DL (ref 32–36)
MCV RBC AUTO: 92 FL (ref 82–98)
MICROSCOPIC COMMENT: NORMAL
MONOCYTES # BLD AUTO: 0.7 K/UL (ref 0.3–1)
MONOCYTES NFR BLD: 9.3 % (ref 4–15)
MV PEAK A VEL: 0.88 M/S
MV PEAK E VEL: 0.74 M/S
NEUTROPHILS # BLD AUTO: 3.5 K/UL (ref 1.8–7.7)
NEUTROPHILS NFR BLD: 47 % (ref 38–73)
NITRITE UR QL STRIP: NEGATIVE
NONHDLC SERPL-MCNC: 124 MG/DL
NRBC BLD-RTO: 0 /100 WBC
PH UR STRIP: 5 [PH] (ref 5–8)
PHOSPHATE SERPL-MCNC: 2.2 MG/DL (ref 2.7–4.5)
PISA TR MAX VEL: 2.49 M/S
PLATELET # BLD AUTO: 216 K/UL (ref 150–350)
PMV BLD AUTO: 9.8 FL (ref 9.2–12.9)
POTASSIUM SERPL-SCNC: 4 MMOL/L (ref 3.5–5.1)
PROT SERPL-MCNC: 7.2 G/DL (ref 6–8.4)
PROT UR QL STRIP: NEGATIVE
PROTHROMBIN TIME: 10.4 SEC (ref 9–12.5)
PULM VEIN S/D RATIO: 1.24
PV PEAK D VEL: 0.41 M/S
PV PEAK S VEL: 0.51 M/S
RA MAJOR: 3.84 CM
RA PRESSURE: 3 MMHG
RA WIDTH: 2.63 CM
RBC # BLD AUTO: 4.2 M/UL (ref 4–5.4)
RBC #/AREA URNS AUTO: 1 /HPF (ref 0–4)
RIGHT VENTRICULAR END-DIASTOLIC DIMENSION: 3.53 CM
RV TISSUE DOPPLER FREE WALL SYSTOLIC VELOCITY 1 (APICAL 4 CHAMBER VIEW): 15.46 CM/S
SINUS: 2.95 CM
SODIUM SERPL-SCNC: 137 MMOL/L (ref 136–145)
SP GR UR STRIP: >=1.03 (ref 1–1.03)
SQUAMOUS #/AREA URNS AUTO: 1 /HPF
STJ: 2.63 CM
TDI LATERAL: 0.1 M/S
TDI SEPTAL: 0 M/S
TDI: 0.1 M/S
TR MAX PG: 25 MMHG
TRICUSPID ANNULAR PLANE SYSTOLIC EXCURSION: 1.54 CM
TRIGL SERPL-MCNC: 339 MG/DL (ref 30–150)
TV REST PULMONARY ARTERY PRESSURE: 28 MMHG
URN SPEC COLLECT METH UR: ABNORMAL
WBC # BLD AUTO: 7.43 K/UL (ref 3.9–12.7)
WBC #/AREA URNS AUTO: 1 /HPF (ref 0–5)

## 2019-06-26 PROCEDURE — G0378 HOSPITAL OBSERVATION PER HR: HCPCS

## 2019-06-26 PROCEDURE — 97161 PT EVAL LOW COMPLEX 20 MIN: CPT

## 2019-06-26 PROCEDURE — 92523 SPEECH SOUND LANG COMPREHEN: CPT

## 2019-06-26 PROCEDURE — 97165 OT EVAL LOW COMPLEX 30 MIN: CPT

## 2019-06-26 PROCEDURE — 25000003 PHARM REV CODE 250: Performed by: STUDENT IN AN ORGANIZED HEALTH CARE EDUCATION/TRAINING PROGRAM

## 2019-06-26 PROCEDURE — 36415 COLL VENOUS BLD VENIPUNCTURE: CPT

## 2019-06-26 PROCEDURE — 25000003 PHARM REV CODE 250: Performed by: NURSE PRACTITIONER

## 2019-06-26 PROCEDURE — 97530 THERAPEUTIC ACTIVITIES: CPT

## 2019-06-26 PROCEDURE — 81001 URINALYSIS AUTO W/SCOPE: CPT

## 2019-06-26 PROCEDURE — 99220 PR INITIAL OBSERVATION CARE,LEVL III: ICD-10-PCS | Mod: ,,, | Performed by: PSYCHIATRY & NEUROLOGY

## 2019-06-26 PROCEDURE — 80053 COMPREHEN METABOLIC PANEL: CPT

## 2019-06-26 PROCEDURE — 92610 EVALUATE SWALLOWING FUNCTION: CPT

## 2019-06-26 PROCEDURE — 99220 PR INITIAL OBSERVATION CARE,LEVL III: CPT | Mod: ,,, | Performed by: PSYCHIATRY & NEUROLOGY

## 2019-06-26 PROCEDURE — 85025 COMPLETE CBC W/AUTO DIFF WBC: CPT

## 2019-06-26 PROCEDURE — 25500020 PHARM REV CODE 255: Performed by: EMERGENCY MEDICINE

## 2019-06-26 RX ORDER — ATORVASTATIN CALCIUM 20 MG/1
20 TABLET, FILM COATED ORAL DAILY
Qty: 90 TABLET | Refills: 0 | Status: ON HOLD | OUTPATIENT
Start: 2019-06-27 | End: 2023-04-14 | Stop reason: HOSPADM

## 2019-06-26 RX ORDER — CLOPIDOGREL BISULFATE 75 MG/1
75 TABLET ORAL DAILY
Status: DISCONTINUED | OUTPATIENT
Start: 2019-06-26 | End: 2019-06-26 | Stop reason: HOSPADM

## 2019-06-26 RX ORDER — ATORVASTATIN CALCIUM 20 MG/1
20 TABLET, FILM COATED ORAL DAILY
Status: DISCONTINUED | OUTPATIENT
Start: 2019-06-26 | End: 2019-06-26 | Stop reason: HOSPADM

## 2019-06-26 RX ORDER — CLOPIDOGREL BISULFATE 75 MG/1
75 TABLET ORAL DAILY
Qty: 30 TABLET | Refills: 0 | Status: SHIPPED | OUTPATIENT
Start: 2019-06-27 | End: 2023-04-13

## 2019-06-26 RX ORDER — CLOPIDOGREL BISULFATE 75 MG/1
TABLET ORAL
Qty: 90 TABLET | Refills: 0 | OUTPATIENT
Start: 2019-06-26

## 2019-06-26 RX ADMIN — CLOPIDOGREL 75 MG: 75 TABLET, FILM COATED ORAL at 10:06

## 2019-06-26 RX ADMIN — ATORVASTATIN CALCIUM 20 MG: 20 TABLET, FILM COATED ORAL at 10:06

## 2019-06-26 RX ADMIN — ESCITALOPRAM OXALATE 20 MG: 20 TABLET ORAL at 10:06

## 2019-06-26 RX ADMIN — ASPIRIN 81 MG: 81 TABLET, COATED ORAL at 10:06

## 2019-06-26 RX ADMIN — LEVOTHYROXINE SODIUM 75 MCG: 25 TABLET ORAL at 06:06

## 2019-06-26 RX ADMIN — IOHEXOL 100 ML: 350 INJECTION, SOLUTION INTRAVENOUS at 12:06

## 2019-06-26 RX ADMIN — LOSARTAN POTASSIUM 100 MG: 50 TABLET, FILM COATED ORAL at 10:06

## 2019-06-26 NOTE — NURSING
Pt in bed with no distress. Plan for the night and meds discussed. Call light in reach. Fall precautions maintained.

## 2019-06-26 NOTE — ASSESSMENT & PLAN NOTE
Stroke risk factor  Patient had been non-compliant with medication for a while  Resumed on atorvastatin 10 mg daily in the beginning of June and increased to 20 a few days ago    - will continue atorvastatin 20 mg daily

## 2019-06-26 NOTE — CONSULTS
"[facility]  Adult Nutrition  Consult Note    SUMMARY       Recommendations    Recommendation/Intervention:     1. Continue cardiac diet as tolerated.   2. If po intake <50%, recommend adding Boost Plus with meals.   3. RD following.     Goals: meet >85% EEN/EPN  Nutrition Goal Status: new  Communication of RD Recs: (POC)    Reason for Assessment    Reason For Assessment: consult  Diagnosis: (TIA)  Relevant Medical History: HTN, HLD, GERD  General Information Comments: Diet advanced this AM to Cardiac. Pt reports normal appetite with no wt loss PTA. Per chart review, pt stable. Denies N/V/D/C. NFPE not warranted. RD does not feel that pt meets malnutrition criteria at this time. Dicussed and provided handout "Cardiac-TLC Nutrition Therapy". Educated pt on foods recommended and not recommend on low fat diet. Pt verbalized understanding with no questions or concerns at this time.  Nutrition Discharge Planning: adeqaute po intake    Nutrition Risk Screen    Nutrition Risk Screen: no indicators present    Nutrition/Diet History    Spiritual, Cultural Beliefs, Jainism Practices, Values that Affect Care: (Caodaism)  Factors Affecting Nutritional Intake: None identified at this time    Anthropometrics    Temp: 97.4 °F (36.3 °C)  Height: 5' 5" (165.1 cm)  Height (inches): 65 in  Weight Method: Bed Scale  Weight: 63.5 kg (139 lb 15.9 oz)  Weight (lb): 139.99 lb  Ideal Body Weight (IBW), Female: 125 lb  % Ideal Body Weight, Female (lb): 111.99 lb  BMI (Calculated): 23.3  BMI Grade: 18.5-24.9 - normal       Lab/Procedures/Meds    Pertinent Labs Reviewed: reviewed  Pertinent Labs Comments: triglycerides 339  Pertinent Medications Reviewed: reviewed  Pertinent Medications Comments: aspirin, statin, levothyroxine, losartan    Estimated/Assessed Needs    Weight Used For Calorie Calculations: 63.5 kg (139 lb 15.9 oz)  Energy Calorie Requirements (kcal): 1425 kcal/day  Energy Need Method: [place](x " 1.25)  Protein Requirements: 51-63 gm/day(0.8-1.0 gm/kg)  Weight Used For Protein Calculations: 63.5 kg (139 lb 15.9 oz)  Fluid Requirements (mL): 1 mL/kcal or per MD     RDA Method (mL): 1425     Nutrition Prescription Ordered    Current Diet Order: Cardiac    Evaluation of Received Nutrient/Fluid Intake    Comments: LBM 6/25  Tolerance: tolerating  % Intake of Estimated Energy Needs: Other: SONYA - diet advanced this AM  % Meal Intake: Other: SONYA - diet advanced this AM    Nutrition Risk    Level of Risk/Frequency of Follow-up: (f/u 1 x wk)     Assessment and Plan    Nutrition Problem  Altered Nutrition Related Laboratory Values    Related to (etiology):   Undesirable Food Choices    Signs and Symptoms (as evidenced by):   Triglycerides 339     Interventions:  Collaboration and Referral of Nutrition Care  Cardiac Diet Education     Nutrition Diagnosis Status:   New     Monitor and Evaluation    Food and Nutrient Intake: energy intake, food and beverage intake  Food and Nutrient Adminstration: diet order  Physical Activity and Function: nutrition-related ADLs and IADLs  Anthropometric Measurements: weight, weight change, body mass index  Biochemical Data, Medical Tests and Procedures: electrolyte and renal panel, gastrointestinal profile, glucose/endocrine profile, inflammatory profile, lipid profile  Nutrition-Focused Physical Findings: overall appearance     Nutrition Follow-Up    RD Follow-up?: Yes

## 2019-06-26 NOTE — PLAN OF CARE
06/26/19 1516   Post-Acute Status   Post-Acute Authorization Other   Other Status No Post-Acute Service Needs       No SW needs noted.  SW in contact with CM and Medical staff. Will continue to follow and offer support as needed.     Vitor Marie LMSW  [facility]   Ext. [phone]

## 2019-06-26 NOTE — ASSESSMENT & PLAN NOTE
72 y/o AAF with multiple risk factors for stroke, p/w transient neurological symptoms x2. ABCD2 score 3 -> 4 with 90-day stroke risk of 9.8%. Will admit for observation and further vascular and cardiac investigation.    MRI negative for acute stroke. Etiology of symptoms likely TIA. Follow up in stroke clinic in 4-6 weeks.     Antithrombotics for secondary stroke prevention: Antiplatelets: Aspirin: 81 mg daily + Plavix 75 mg for 30 days    Statins for secondary stroke prevention and hyperlipidemia, if present:   Statins: Atorvastatin- 20 mg daily    Aggressive risk factor modification: HTN, HLD, Exercise     Rehab efforts: The patient has been evaluated by a stroke team provider and the therapy needs have been fully considered based off the presenting complaints and exam findings. The following therapy evaluations are needed: PT evaluate and treat, OT evaluate and treat, SLP evaluate and treat - home with no needs     Diagnostics ordered/pending: NA    VTE prophylaxis: None: Reason for No Pharmacological VTE Prophylaxis: Ambulating with or without assistance    BP parameters: TIA: normotension

## 2019-06-26 NOTE — PLAN OF CARE
PCP: Krys Rivera MD  Pharmacy:   [facility] [place], [place] - [address] [facility] [address]  [address]  [place] [address]  Phone: [phone] Fax: [phone]         06/26/19 1045   Discharge Assessment   Assessment Type Discharge Planning Assessment   Confirmed/corrected address and phone number on facesheet? Yes   Assessment information obtained from? Patient   Expected Length of Stay (days)   (TBD)   Communicated expected length of stay with patient/caregiver yes   Prior to hospitilization cognitive status: Alert/Oriented;No Deficits   Prior to hospitalization functional status: Independent   Current cognitive status: Alert/Oriented;No Deficits   Current Functional Status: Independent   Lives With alone   Able to Return to Prior Arrangements yes   Is patient able to care for self after discharge? Yes   Who are your caregiver(s) and their phone number(s)? Gibson Cain     [phone]    Patient's perception of discharge disposition home or selfcare   Readmission Within the Last 30 Days no previous admission in last 30 days   Patient currently being followed by outpatient case management? No   Patient currently receives any other outside agency services? No   Equipment Currently Used at Home none   Do you have any problems affording any of your prescribed medications? No   Is the patient taking medications as prescribed? yes   Does the patient have transportation home? Yes   Transportation Anticipated family or friend will provide   Does the patient receive services at the Coumadin Clinic? No   Discharge Plan A Home   Discharge Plan B Home;Home with family   DME Needed Upon Discharge    (TBD)   Patient/Family in Agreement with Plan yes

## 2019-06-26 NOTE — H&P
[facility]  Vascular Neurology  Comprehensive Stroke Center  History & Physical    Consults  Assessment/Plan:     Patient is a 73 y.o. year old female with:    * TIA (transient ischemic attack)  72 y/o AAF with multiple risk factors for stroke, p/w transient neurological symptoms x2. ABCD2 score 3 -> 4 with 90-day stroke risk of 9.8%. Will admit for observation and further vascular and cardiac investigation.    Antithrombotics for secondary stroke prevention: Antiplatelets: Aspirin: 81 mg daily    Statins for secondary stroke prevention and hyperlipidemia, if present:   Statins: Atorvastatin- 40 mg daily    Aggressive risk factor modification: HTN, HLD, Exercise     Rehab efforts: The patient has been evaluated by a stroke team provider and the therapy needs have been fully considered based off the presenting complaints and exam findings. The following therapy evaluations are needed: PT evaluate and treat, OT evaluate and treat, SLP evaluate and treat    Diagnostics ordered/pending: CTA Head to assess vasculature , CTA Neck/Arch to assess vasculature, HgbA1C to assess blood glucose levels, Lipid Profile to assess cholesterol levels, TTE to assess cardiac function/status     VTE prophylaxis: None: Reason for No Pharmacological VTE Prophylaxis: Ambulating with or without assistance    BP parameters: TIA: symptoms have resolved, goal sBP<180 until vascular imaging confirms absence of vascular stenosis    Hypertension  On losartan 100 mg daily at home, compliant  sBP elevated to 160 and then 210 upon arrival, requiring Hydralazine IV x1    - will continue home medication  - goal sBP<180 for now until vascular imaging confirms patency of extra/intracranial vasculature    Hyperlipemia  Stroke risk factor  Patient had been non-compliant with medication for a while  Resumed on atorvastatin 20 mg daily in the beginning of June and increased to 40 a few days ago    - will continue atorvastatin 40 mg  daily    Depression with anxiety  - continue escitalopram 20 mg daily    Thyroid disease  TSH WNL    - continue home levothyroxine 75 mcg qAM      STROKE DOCUMENTATION      NIH Scale:  1a. Level of Consciousness: 0-->Alert, keenly responsive  1b. LOC Questions: 0-->Answers both questions correctly  1c. LOC Commands: 0-->Performs both tasks correctly  2. Best Gaze: 0-->Normal  3. Visual: 0-->No visual loss  4. Facial Palsy: 0-->Normal symmetrical movements  5a. Motor Arm, Left: 0-->No drift, limb holds 90 (or 45) degrees for full 10 secs  5b. Motor Arm, Right: 0-->No drift, limb holds 90 (or 45) degrees for full 10 secs  6a. Motor Leg, Left: 0-->No drift, leg holds 30 degree position for full 5 secs  6b. Motor Leg, Right: 0-->No drift, leg holds 30 degree position for full 5 secs  7. Limb Ataxia: 0-->Absent  8. Sensory: 0-->Normal, no sensory loss  9. Best Language: 0-->No aphasia, normal  10. Dysarthria: 0-->Normal  11. Extinction and Inattention (formerly Neglect): 0-->No abnormality  Total (NIH Stroke Scale): 0     Modified [place] Score: 0  [place] Coma Scale:    ABCD2 Score: 4  [id number] Score:   HAS -BLED Score:   ICH Score:   Hunt & Peterson Classification:      Thrombolysis Candidate? No, symptoms resolved (TIA)    Delays to Thrombolysis?  No    Interventional Revascularization Candidate?   Is the patient eligible for mechanical endovascular reperfusion (RC)?  No; No significant neurological deficit    Hemorrhagic change of an Ischemic Stroke: Does this patient have an ischemic stroke with hemorrhagic changes? No     Subjective:     History of Present Illness:  The patient is a 74 y/o -American F with HTN, HLD, anxiety and depression, who is being evaluated by vascular neurology for a possible TIA.     The patient was brought to the ED for evaluation of transient neurological symptoms. She reports one episode of numbness and tingling in the left side of the face and tongue that happened on Saturday and  resolved spontaneously after 30 minutes. She again noted left hand weakness today at noon, when she was trying to reach out to a customer and grab their receipt (patient is a  at [employer]). The symptoms lasted less then a minute and resolved spontaneously. She denies any numbness and tingling this time. Overall she denies any change in vision, speech or balance associated with these two events. she has remained alert and awake throughout these episodes and denies any confusion. She has never had similar symptoms before. Upon questioning, she mentions shortness of breath on exertion however she denies palpitations, or chest pain associated with this shortness of breath or with any of the neurological events. She reports compliance with her blood pressure medication however she had not been taking her atorvastatin for a while and resumed taking it again in the beginning of June. She denies any previous strokes or TIAs. She is not a smoker.           Past Medical History:   Diagnosis Date    Hyperlipemia     Hypertension     Thyroid disease      Past Surgical History:   Procedure Laterality Date    HERNIA REPAIR       Family History   Problem Relation Age of Onset    Hypertension Mother     Dementia Mother     Heart disease Father      Social History     Tobacco Use    Smoking status: Never Smoker    Smokeless tobacco: Never Used   Substance Use Topics    Alcohol use: No    Drug use: No     Review of patient's allergies indicates:  No Known Allergies    Medications: I have reviewed the current medication administration record.      (Not in a hospital admission)    Review of Systems   Constitutional: Positive for fatigue. Negative for activity change, chills, fever and unexpected weight change. Diaphoresis: exertional.   HENT: Negative for drooling, hearing loss, trouble swallowing and voice change.    Eyes: Negative for photophobia and visual disturbance.   Respiratory: Positive for shortness of breath  (exertional). Negative for choking.    Cardiovascular: Negative for chest pain and palpitations.   Gastrointestinal: Negative for abdominal pain, nausea and vomiting.   Genitourinary: Negative for dysuria, frequency and hematuria.   Musculoskeletal: Negative for back pain, gait problem, neck pain and neck stiffness.   Skin: Negative for rash.   Allergic/Immunologic: Negative for immunocompromised state.   Neurological: Positive for weakness (resolved). Negative for dizziness, syncope, speech difficulty, numbness and headaches.   Psychiatric/Behavioral: Negative for agitation, confusion, decreased concentration, hallucinations and sleep disturbance. The patient is not nervous/anxious.      Objective:     Vital Signs (Most Recent):  Temp: 97.2 °F (36.2 °C) (06/25/19 1940)  Pulse: 66 (06/25/19 2148)  Resp: 15 (06/25/19 2148)  BP: 138/68 (06/25/19 2142)  SpO2: 100 % (06/25/19 2148)    Vital Signs Range (Last 24H):  Temp:  [97.2 °F (36.2 °C)]   Pulse:  [60-75]   Resp:  [15-22]   BP: (138-210)/(68-91)   SpO2:  [98 %-100 %]     Physical Exam   Constitutional: She is oriented to person, place, and time. She appears well-developed and well-nourished. She is cooperative. She does not appear ill. No distress.   HENT:   Head: Normocephalic and atraumatic.   Mouth/Throat: Mucous membranes are normal.   Eyes: Pupils are equal, round, and reactive to light. Conjunctivae and EOM are normal.   Neck: Normal range of motion.   Cardiovascular: Normal rate and regular rhythm.   Pulmonary/Chest: Effort normal. No tachypnea. No respiratory distress.   Abdominal: Soft. She exhibits no distension. There is no tenderness.   Musculoskeletal: Normal range of motion. She exhibits no edema.   Neurological: She is alert and oriented to person, place, and time. She displays no seizure activity.   Skin: Skin is warm. She is not diaphoretic. No cyanosis or erythema.   Psychiatric: She has a normal mood and affect. Her speech is normal and behavior  is normal.   Nursing note and vitals reviewed.      Neurological Exam:   LOC: alert  Attention Span: Good   Language: No aphasia  Articulation: No dysarthria  Orientation: Person, Place, Time   Visual Fields: Full  EOM (CN III, IV, VI): Full/intact  Pupils (CN II, III): PERRL  Facial Sensation (CN V): Normal  Facial Movement (CN VII): Symmetric facial expression    Motor: 5/5 throughout  Cebellar: No evidence of appendicular or axial ataxia  Sensation: Intact to light touch, temperature and vibration      Laboratory:  CMP:   Recent Labs   Lab 06/25/19 2123   CALCIUM 9.7   ALBUMIN 3.9   PROT 8.2      K 3.7   CO2 22*      BUN 16   CREATININE 1.0   ALKPHOS 113   ALT 15   AST 21   BILITOT 0.2     CBC:   Recent Labs   Lab 06/25/19 2123   WBC 8.73   RBC 4.24   HGB 12.8   HCT 39.3      MCV 93   MCH 30.2   MCHC 32.6     Lipid Panel: No results for input(s): CHOL, LDLCALC, HDL, TRIG in the last 168 hours.  Coagulation: No results for input(s): PT, INR, APTT in the last 168 hours.  Hgb A1C: No results for input(s): HGBA1C in the last 168 hours.  TSH:   Recent Labs   Lab 06/25/19 2123   TSH 3.228       OSH lab results (6/4/19):  Chol 229  HDL 39        Diagnostic Results:    CXR (6/25/19):  No cardiomegaly  CP angles clear bilateral  No evidence of pulmonary edema    Brain imaging:  None    Vessel Imaging:  None    Cardiac Evaluation:     ECG (6/25/2019):  Normal sinus rhythm and rate ~60  Normal axis  LBBB  QTc 475, prolonged          Radha Andino MD  Comprehensive Stroke Center  Department of Vascular Neurology   [facility]

## 2019-06-26 NOTE — PT/OT/SLP EVAL
"Speech Language Pathology Evaluation  Cognitive/Bedside Swallow & Discharge SUmmary    Patient Name:  Elda Cain   MRN:  [id number]  Admitting Diagnosis: TIA (transient ischemic attack)    Recommendations:                  General Recommendations:  Follow-up not indicated  Diet recommendations:  Regular, Thin   Aspiration Precautions: Standard aspiration precautions   General Precautions: Standard, fall  Communication strategies:  none    History:     Past Medical History:   Diagnosis Date    Hyperlipemia     Hypertension     Thyroid disease        Past Surgical History:   Procedure Laterality Date    HERNIA REPAIR         Social History: Patient lives alone in [place].  She is a mother and grandmother. She endorsed good family support.     Chest X-Rays: 6/25/19: No acute cardiopulmonary finding identified on this single view.    MRI 6/26/19: No evidence of recent infarction or other acute intracranial pathology.    Modest chronic microvascular ischemic change.    Ventriculomegaly which is slightly out of proportion of degree of sulcal enlargement.  Favor central predominant cerebral volume loss over normal pressure hydrocephalus, but clinical correlation advised.    Prior diet: regular, thin    Occupation/hobbies/homemaking: Pt is retired and currently works part time at [employer] as a  and enjoys watching television in her free time.  Independent with ADLs prior to admit .    Subjective     SLP reviewed Pt with RN  Pt presents calm, cooperative  SHe explains, "I am doing well, I've been checking my facebook all day."  She denies pain    Pain/Comfort:  · Pain Rating 1: 0/10    Objective:   Pt found upright and awake in chair in room, call light in reach t/o assessment.     Cognitive Status:    Attention No obvious deficits observed with sustained or divided attention tasks  Perseveration Not present  Orientation Oriented x4  Memory Immediate Recall WNL for FO3-4 unrelated items, Delayed: WNL for 3/3 " unrelated items post 3 minute filled delay and long term recall : intact  Problem Solving Solutions : Pt provided solutions to general reasoning/household ADL tasks without need for expansion or clarification. She compared/contrasted items in a FO2 appropriately, I'ly  Safety awareness :intact  Managing finances : WNL for fx math word problems for money mngt.   Simple calculation : intact      Receptive Language:   Comprehension:      Questions Complex yes/no WNL  Commands  One step WNL  two step basic commands WNL    Pragmatics:    WNL    Expressive Language:  Verbal:  No word finding difficulty noted at the conversational level  t/o spontaneous greetings or spontaneous speech tasks. She completed convergent naming and responsive speech tasks WNL, I'ly. She demonstrated appropriate sentence formation.       Motor Speech:  WFL    Voice:   WFL    Visual-Spatial:  WFL. Pt completed clock drawing tasks WNL, I'ly    Reading:   Functional reading task : WNL for functional reading tasks tasks.     Written Expression:   WFL  Dominant hand: Right  Assessment hand: Right    Oral Musculature Evaluation  · Oral Musculature: WFL  · Dentition: present and adequate  · Secretion Management: adequate  · Mucosal Quality: adequate  · Mandibular Strength and Mobility: WNL  · Oral Labial Strength and Mobility: WNL  · Volitional Cough: present  · Volitional Swallow: elicited  · Voice Prior to PO Intake: clear, normal intensity     Bedside Swallow Eval:   Consistencies Assessed:  · Thin liquids cup edge sips x4  · Solids : bites of papi crackers x4     Oral Phase:   · WFL    Pharyngeal Phase:   · no overt clinical signs/symptoms of aspiration  · no overt clinical signs/symptoms of pharyngeal dysphagia    Compensatory Strategies  · None    Treatment: Pt tolerated bites of regular solids and cup edge sips thin liquids without overt S/S aspiration. She was educated on SLP role, S/S aspiration, safety precautions/stroke awareness (FAST)  and recommendations to d/c ST at this time with option to re-consult should she notice any change in status. She verbalized understanding. No questions noted. Whiteboard current. [email or web address] reviewed with RN following session. Pt remained in position found with call light in reach upon SLP exit from room.     Assessment:     Elda Cain is a 73 y.o. female with an SLP diagnosis of risk for aspiratin.  She presents with no overt S/S aspiration with trials at the bedside. Speech, language and cognition deemed at baseline. No additional ST warranted at this time. Please reconsult should status change. .    Goals:   Multidisciplinary Problems     SLP Goals     Not on file          Multidisciplinary Problems (Resolved)        Problem: SLP Goal    Goal Priority Disciplines Outcome   SLP Goal   (Resolved)     SLP Outcome(s) achieved                   Plan:     · Patient to be seen:      · Plan of Care expires:  07/26/19  · Plan of Care reviewed with:      · SLP Follow-Up:  No       Discharge recommendations:  Discharge Facility/Level of Care Needs: home     Time Tracking:     SLP Treatment Date:   06/26/19  Speech Start Time:  1302  Speech Stop Time:  1326     Speech Total Time (min):  24 min    Billable Minutes: Eval 14  and Eval Swallow and Oral Function 10     LUKE Miller., [facility]-SLP  Speech-Language Pathology  Pager: [phone]      06/26/2019

## 2019-06-26 NOTE — PLAN OF CARE
Problem: Physical Therapy Goal  Goal: Physical Therapy Goal  Evaluation/discharge. No PT needs at this time.

## 2019-06-26 NOTE — ED PROVIDER NOTES
"Encounter Date: 6/25/2019    SCRIBE #1 NOTE: I, Yulisa Phillipst, am scribing for, and in the presence of,  Dr. Wilcox. I have scribed the entire note.       History     Chief Complaint   Patient presents with    Numbness     left side of face onset saturday, left arm today while at work.  states "it didn't last very long"  denies symptoms presently     Time patient was seen by the provider: 9:57 PM      The patient is a 73 y.o. female with co-morbidities including: hypertension and hyperlipidemia, hypothyroid, who presents to the ED with a complaint of left-sided facial numbness followed byweakness to her left arm. Pt reports that three days ago she karina "weird" sensation with the left side of her tongue and face going numb, lasting for a minute then resolving. No CP, SOB, N/V, vision changes, or headache with this symptom.  numbness on left side of face x 1 min.  Earlier today while working she briefly felt her left arm become weak and she was unable to move her left arm. She states that this weakness also "didn't last very long" and that the symptoms resolved on their own.  Pt denies feeling pressure on her chest, SOB, fatigue, headache, or dizziness. She denies any facial numbness today. Pt is on medication for her blood pressure and measures her blood pressure at home. She checked her blood pressure today and it was not high. Upon ROS pt admits to an intermittent "pulling" sensation in her left leg, but denies any recent travel and does not take oral contraceptives. Pt is not a smoker.    The history is provided by the patient and medical records.     Review of patient's allergies indicates:  No Known Allergies  Past Medical History:   Diagnosis Date    Hyperlipemia     Hypertension     Thyroid disease      Past Surgical History:   Procedure Laterality Date    HERNIA REPAIR       Family History   Problem Relation Age of Onset    Hypertension Mother     Dementia Mother     Heart disease Father      Social " History     Tobacco Use    Smoking status: Never Smoker    Smokeless tobacco: Never Used   Substance Use Topics    Alcohol use: No    Drug use: No     Review of Systems   Constitutional: Negative for chills, diaphoresis, fatigue and fever.   HENT: Negative for congestion, sinus pressure, sore throat and voice change.    Eyes: Negative for visual disturbance.        Neg vision changes   Respiratory: Negative for cough, chest tightness and shortness of breath.    Cardiovascular: Negative for chest pain, palpitations and leg swelling.   Gastrointestinal: Negative for abdominal pain, nausea and vomiting.        Neg changes in stool   Genitourinary:        Neg changes in urination   Musculoskeletal: Negative for arthralgias, joint swelling, neck pain and neck stiffness.   Skin: Negative for color change and rash.   Allergic/Immunologic: Negative for immunocompromised state.   Neurological: Positive for weakness and numbness. Negative for dizziness, facial asymmetry, speech difficulty, light-headedness and headaches.   Hematological: Does not bruise/bleed easily.   Psychiatric/Behavioral: Negative for confusion.       Physical Exam     Initial Vitals [06/25/19 1940]   BP Pulse Resp Temp SpO2   (!) 159/77 72 18 97.2 °F (36.2 °C) 99 %      MAP       --         Physical Exam    Nursing note and vitals reviewed.  Constitutional: She appears well-developed and well-nourished. She is not diaphoretic. No distress.   HENT:   Head: Normocephalic and atraumatic.   Nose: Nose normal.   Eyes: EOM are normal. Pupils are equal, round, and reactive to light.   Neck: Normal range of motion. Neck supple.   No carotid bruit.    Cardiovascular: Normal rate and regular rhythm.   Pulmonary/Chest: Breath sounds normal. No respiratory distress. She has no wheezes. She has no rhonchi. She has no rales. She exhibits no tenderness.   Abdominal: Soft. Bowel sounds are normal. She exhibits no distension. There is no tenderness.    Musculoskeletal: Normal range of motion. She exhibits no edema.   No LE swelling or tenderness.   Neurological: She is alert and oriented to person, place, and time. She has normal strength. No cranial nerve deficit.   Skin: Skin is warm and dry. No rash noted.   No erythema to LE.   Psychiatric: She has a normal mood and affect. Her behavior is normal. Thought content normal.         ED Course   Procedures  Labs Reviewed   CBC W/ AUTO DIFFERENTIAL - Abnormal; Notable for the following components:       Result Value    Eos # 0.6 (*)     All other components within normal limits   COMPREHENSIVE METABOLIC PANEL - Abnormal; Notable for the following components:    CO2 22 (*)     eGFR if non  56.0 (*)     All other components within normal limits   PHOSPHORUS - Abnormal; Notable for the following components:    Phosphorus 2.2 (*)     All other components within normal limits    Narrative:     Fasting   LIPID PANEL - Abnormal; Notable for the following components:    Triglycerides 339 (*)     HDL 32 (*)     LDL Cholesterol 56.2 (*)     All other components within normal limits    Narrative:     Fasting   CK-MB - Abnormal; Notable for the following components:     (*)     All other components within normal limits    Narrative:     Fasting   POCT GLUCOSE - Abnormal; Notable for the following components:    POCT Glucose 149 (*)     All other components within normal limits   MAGNESIUM   TSH   TROPONIN I   MAGNESIUM    Narrative:     Fasting   HEMOGLOBIN A1C    Narrative:     Fasting   APTT    Narrative:     Fasting   PROTIME-INR    Narrative:     Fasting     EKG Readings: (Independently Interpreted)   9:33 PM  NSR at rate of 61. LBBB, , , QTc 475. Do not have prior EKG to compare to.     ECG Results          EKG 12-lead (Final result)  Result time 06/26/19 10:34:11    Final result by Interface, Lab In [facility] (06/26/19 10:34:11)                 Narrative:    Test Reason :  R29.898,    Vent. Rate : 061 BPM     Atrial Rate : 061 BPM     P-R Int : 150 ms          QRS Dur : 154 ms      QT Int : 472 ms       P-R-T Axes : 077 070 -52 degrees     QTc Int : 475 ms    Normal sinus rhythm  Left bundle branch block  Abnormal ECG  No previous ECGs available  Confirmed by Alfonso HAQ MD, Bishnu PENN ([id number]) on 6/26/2019 10:34:01 AM    Referred By: [email or web address]   SELF           Confirmed By:Bishnu Hamilton III, MD                            Imaging Results          CTA Head and Neck (xpd) (Final result)  Result time 06/26/19 01:20:09    Final result by Nakul Marin MD (06/26/19 01:20:09)                 Impression:      No acute abnormality. No high-grade stenosis or major vessel occlusion.    Generalized cerebral volume loss.    Mild calcific atherosclerosis of the right carotid bifurcation and distal left vertebral artery.    Electronically signed by resident: Erwin Kaur  Date:    06/26/2019  Time:    00:41    Electronically signed by: Nakul Marin MD  Date:    06/26/2019  Time:    01:20             Narrative:    EXAMINATION:  CTA HEAD AND NECK (XPD)    CLINICAL HISTORY:  TIA;    TECHNIQUE:  Non contrast low dose axial images were obtained through the head. CT angiogram was performed from the level of the jose to the top of the head following the IV administration of 100mL of Omnipaque 350.   Sagittal and coronal reconstructions and maximum intensity projection reconstructions were performed. Arterial stenosis percentages are based on NASCET measurement criteria.    COMPARISON:  MRI 03/21/2018.    FINDINGS:  Intracranial Compartment:    There is stable prominence of the ventricles and sulci compatible with generalized cerebral volume loss.  No extra-axial blood or fluid collections.  Small calcification present within a right occipital lobe sulcus, of uncertain clinical significance.    No parenchymal mass, hemorrhage, edema, or major vascular distribution infarct.  No midline  "shift.    Skull/Extracranial Contents (limited evaluation): No fracture. Mastoid air cells and paranasal sinuses are essentially clear.    Non-Vascular Structures of the Neck/Thoracic Inlet (limited evaluation): No acute abnormality of the visualized lung apices.  There are degenerative changes of the cervical spine.    Aorta: Normal 3 vessel arch.    Extracranial carotid circulation: Calcific atherosclerosis at the right carotid bifurcation.  No hemodynamically significant stenosis, aneurysmal dilatation, or dissection.    Extracranial vertebral circulation: Mild calcific atherosclerosis of the distal left vertebral artery.  No hemodynamically significant stenosis, aneurysmal dilatation, or dissection.    Intracranial Arteries: The P1 segment of the right PCA is not identified and likely hypoplastic and there is persistent right fetal circulation.  No focal high-grade stenosis, occlusion, or aneurysm.    Venous structures (limited evaluation): Normal.                               X-Ray Chest AP Single View (Final result)  Result time 06/25/19 23:55:52    Final result by Nakul Marin MD (06/25/19 23:55:52)                 Impression:      No acute cardiopulmonary finding identified on this single view.      Electronically signed by: Nakul Marin MD  Date:    06/25/2019  Time:    23:55             Narrative:    EXAMINATION:  XR CHEST 1 VIEW    CLINICAL HISTORY:  Provided history is "ROSS;  ".    TECHNIQUE:  One view of the chest.    COMPARISON:  None.    FINDINGS:  Cardiac wires overlie the chest.  Cardiac silhouette is magnified by portable technique but not felt to be significantly enlarged.  Atherosclerotic calcifications overlie the aortic arch.  No focal area of consolidation.  No sizable pleural effusion.  No pneumothorax.                                 Medical Decision Making:   History:   Old Medical Records: I decided to obtain old medical records.  Initial Assessment:   73F with PMh HTN, HLD, " hypothyroid. On Sat pt felt numbness on left side of face x 1 min. Today pt felt L arm weakness, while attempting to hand someone something felt as if she couldnt lift her arm, lasting 1 min. Pt also reports band-like HA for the past 2 wks. Also c/o ROSS for months, which she attributes to getting older and deconditioning.  Patient notes that she saw her PCP on Monday, and he noted her HLD levels were high and Losartan was doubled. No dizziness, vision changes, N/V, confusion, CP.  Upon arrival to ED blood pressure init 213/102, now decreased to 182/84 spontaneously, however patient notes that her blood pressure was not elevated and was in fact 119 systolic when she had the symptoms of left arm weakness.  On exam well-appearing, no focal neuro deficits, no carotid bruits.    Differential Diagnosis:    Hypertensive urgency, TIA, CVA, ACS, hypothyroid, migraine with aura, hypoglycemia  Clinical Tests:   Lab Tests: Ordered and Reviewed  Medical Tests: Ordered and Reviewed  ED Management:  CBC, CMP, EKG, Trop, TSH ordered.  Will discuss with vascular Neurology regarding MRA versus CTA  Patient given hydralazine 10 mg for hypertension, now 138/68.    11:21 PM  Discussed with vascular Neurology, they have evaluated patient and would like to admit her for further workup.  Other:   I have discussed this case with another health care provider.    Additional MDM:     NIH Stroke Scale:   Interval = baseline (upon arrival/admit)  Level of consciousness = 0 - alert  LOC questions = 0 - answers both correctly  Best gaze = 0 - normal  Visual = 0 - no visual loss  Facial palsy = 0 - normal  Motor left arm =  0 - no drift  Motor right arm =  0 - no drift  Motor left leg = 0 - no drift  Motor right leg =  0 - no drift  Limb ataxia = 0 - absent  Sensory = 0 - normal  Best language = 0 - no aphasia  Dysarthria = 0 - normal articulation  Extinction and inattention = 0 - no neglect  NIH Stroke Scale Total = 0         Scribe Attestation:    Scribe #1: I performed the above scribed service and the documentation accurately describes the services I performed. I attest to the accuracy of the note.               Clinical Impression:       ICD-10-CM ICD-9-CM   1. Left arm weakness R29.898 729.89   2. Other specified transient cerebral ischemias G45.8 435.8   3. Numbness of tongue R20.0 782.0   4. Hypertensive urgency I16.0 401.9         Disposition:   Disposition: Placed in Observation                        Candy Wilcox MD  06/27/19 [id number]

## 2019-06-26 NOTE — PLAN OF CARE
Problem: Adult Inpatient Plan of Care  Goal: Plan of Care Review      Recommendations    Recommendation/Intervention:     1. Continue cardiac diet as tolerated.   2. If po intake <50%, recommend adding Boost Plus with meals.   3. RD following.     Goals: meet >85% EEN/EPN  Nutrition Goal Status: new

## 2019-06-26 NOTE — PLAN OF CARE
Problem: SLP Goal  Goal: SLP Goal  Outcome: Outcome(s) achieved Date Met: 06/26/19  SLP evaluation completed. Patient appears at baseline for swallow, speech and cognitive-linguistic skills. No additional ST warranted at this time. REC: continue regular diet with thin liquids. ST to d/c therapy. Please re consult if status should change.     LUKE Miller., [facility]-SLP  Speech-Language Pathology  Pager: [phone]  6/26/2019

## 2019-06-26 NOTE — ASSESSMENT & PLAN NOTE
74 y/o AAF with multiple risk factors for stroke, p/w transient neurological symptoms x2. ABCD2 score 3 -> 4 with 90-day stroke risk of 9.8%. Will admit for observation and further vascular and cardiac investigation.    Antithrombotics for secondary stroke prevention: Antiplatelets: Aspirin: 81 mg daily    Statins for secondary stroke prevention and hyperlipidemia, if present:   Statins: Atorvastatin- 40 mg daily    Aggressive risk factor modification: HTN, HLD, Exercise     Rehab efforts: The patient has been evaluated by a stroke team provider and the therapy needs have been fully considered based off the presenting complaints and exam findings. The following therapy evaluations are needed: PT evaluate and treat, OT evaluate and treat, SLP evaluate and treat    Diagnostics ordered/pending: CTA Head to assess vasculature , CTA Neck/Arch to assess vasculature, HgbA1C to assess blood glucose levels, Lipid Profile to assess cholesterol levels, TTE to assess cardiac function/status     VTE prophylaxis: None: Reason for No Pharmacological VTE Prophylaxis: Ambulating with or without assistance    BP parameters: TIA: symptoms have resolved, goal sBP<180 until vascular imaging confirms absence of vascular stenosis

## 2019-06-26 NOTE — ASSESSMENT & PLAN NOTE
Stroke risk factor  Patient had been non-compliant with medication for a while  Resumed on atorvastatin 20 mg daily in the beginning of June and increased to 40 a few days ago    - will continue atorvastatin 40 mg daily

## 2019-06-26 NOTE — HOSPITAL COURSE
73 year old with past medical hx of HTN, HLD, and anxiety/depression presented to [facility] ED with complaints of left arm weakness primarily in her hand. She reports one episode of numbness and tingling in the left side of the face and tongue that happened on Saturday and resolved spontaneously after 30 minutes. The left arm numbness started while she was at work and she states at the time her arm felt heavy. 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 Her symptoms resolved within a minute. She took two ASA at the time. When she got home she googled her symptoms and was concerned she might have had a stroke. CTA was completed which did not reveal any occlusion or high grade stenosis. MRI completed and no acute infarct seen. Thought symptoms likely due to TIA. Patient started on DAPT X 30 days then monotherapy. Patient recently started taking Atorvastatin 20 mg prescribed by her PCP. Her LDL was <70 so 20 mg continued. Patient was instructed on new medications and follow up appointments. She is in agreement with discharge plan and verbalized understating. She was seen by therapy and discharged home with no needs.

## 2019-06-26 NOTE — PLAN OF CARE
Problem: Occupational Therapy Goal  Goal: Occupational Therapy Goal  Goals not set 2* no further OT needed.  Outcome: Outcome(s) achieved Date Met: 06/26/19  OT evaluation completed.  KAMRAN Mcdonald  6/26/2019

## 2019-06-26 NOTE — HPI
The patient is a 72 y/o -American F with HTN, HLD, anxiety and depression, who is being evaluated by vascular neurology for a possible TIA.     The patient was brought to the ED for evaluation of transient neurological symptoms. She reports one episode of numbness and tingling in the left side of the face and tongue that happened on Saturday and resolved spontaneously after 30 minutes. She again noted left hand weakness today at noon, when she was trying to reach out to a customer and grab their receipt (patient is a  at [employer]). The symptoms lasted less then a minute and resolved spontaneously. She denies any numbness and tingling this time. Overall she denies any change in vision, speech or balance associated with these two events. she has remained alert and awake throughout these episodes and denies any confusion. She has never had similar symptoms before. Upon questioning, she mentions shortness of breath on exertion however she denies palpitations, or chest pain associated with this shortness of breath or with any of the neurological events. She reports compliance with her blood pressure medication however she had not been taking her atorvastatin for a while and resumed taking it again in the beginning of June. She denies any previous strokes or TIAs. She is not a smoker.

## 2019-06-26 NOTE — ASSESSMENT & PLAN NOTE
On losartan 100 mg daily at home, compliant  sBP elevated to 160 and then 210 upon arrival, requiring Hydralazine IV x1    - will continue home medication  - goal sBP<180 for now until vascular imaging confirms patency of extra/intracranial vasculature

## 2019-06-26 NOTE — ASSESSMENT & PLAN NOTE
On losartan 100 mg daily at home, compliant  sBP elevated to 160 and then 210 upon arrival, requiring Hydralazine IV x1    Home medication continued at discharge

## 2019-06-26 NOTE — ED NOTES
"Pt reports one episode of numbness to left side of face and tongue and states "It was a feeling I have never felt before, it was just a weird feeling but it went away pretty quickly". Pt also reports one episode of weakness to left hand which last "one minute" today "around lunch at work". Pt denies any numbness/tingling or weakness to any extremities currently. Pt denies HX of TIA or stroke. Pt reports frontal headache "for a week or two" 2/10 pain, pt reports relief with ibuprofen. Pt denies any recent falls, chest pain, changes in vision, SOB.   "
Attempted to call report x1, unsuccessful. Will try again.   
Patient placed on continuous cardiac monitor, automatic blood pressure cuff and continuous pulse oximeter. Pt changed into hospital gown.   
Pt denies any complaints or needs at this time. Pt resting on stretcher in NAD. Side rails up X2. Bed low and locked. Call light within reach. Pt update on POC, EKG obtained, blood work sent, and HTN medication administered. Pt verbalizes understanding.   
Resident at bedside.   
Telemetry Verification   Patient placed on Telemetry Box  Verified with War Room  Box # [id number]   Monitor Tech    Rate 77   Rhythm NSR     
Alert/Awake

## 2019-06-26 NOTE — DISCHARGE SUMMARY
[facility]  Vascular Neurology  Comprehensive Stroke Center  Discharge Summary     Summary:     Admit Date: 6/25/2019  8:26 PM    Discharge Date and Time:  06/26/2019 5:12 PM    Attending Physician: Levi Faith MD     Discharge Provider: Diana Perea NP    History of Present Illness: The patient is a 72 y/o -American F with HTN, HLD, anxiety and depression, who is being evaluated by vascular neurology for a possible TIA.     The patient was brought to the ED for evaluation of transient neurological symptoms. She reports one episode of numbness and tingling in the left side of the face and tongue that happened on Saturday and resolved spontaneously after 30 minutes. She again noted left hand weakness today at noon, when she was trying to reach out to a customer and grab their receipt (patient is a  at [employer]). The symptoms lasted less then a minute and resolved spontaneously. She denies any numbness and tingling this time. Overall she denies any change in vision, speech or balance associated with these two events. she has remained alert and awake throughout these episodes and denies any confusion. She has never had similar symptoms before. Upon questioning, she mentions shortness of breath on exertion however she denies palpitations, or chest pain associated with this shortness of breath or with any of the neurological events. She reports compliance with her blood pressure medication however she had not been taking her atorvastatin for a while and resumed taking it again in the beginning of June. She denies any previous strokes or TIAs. She is not a smoker.       Hospital Course (synopsis of major diagnoses, care, treatment, and services provided during the course of the hospital stay): 73 year old with past medical hx of HTN, HLD, and anxiety/depression presented to [facility] with complaints of left arm weakness primarily in her hand. She reports one episode of numbness and  tingling in the left side of the face and tongue that happened on Saturday and resolved spontaneously after 30 minutes. The left arm numbness started while she was at work and she states at the time her arm felt heavy. 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 Her symptoms resolved within a minute. She took two ASA at the time. When she got home she googled her symptoms and was concerned she might have had a stroke. CTA was completed which did not reveal any occlusion or high grade stenosis. MRI completed and no acute infarct seen. Thought symptoms likely due to TIA. Patient started on DAPT X 30 days then monotherapy. Patient recently started taking Atorvastatin 20 mg prescribed by her PCP. Her LDL was <70 so 20 mg continued. Patient was instructed on new medications and follow up appointments. She is in agreement with discharge plan and verbalized understating. She was seen by therapy and discharged home with no needs. PCP appointment arranged prior to discharge and patient educated on importance of PCP and stroke follow up appointments    Stroke Etiology: Not Applicable/Not Ischemic Stroke    STROKE DOCUMENTATION         NIH Scale:  Interval: 7 days or at discharge (whichever comes first)  1a. Level of Consciousness: 0-->Alert, keenly responsive  1b. LOC Questions: 0-->Answers both questions correctly  1c. LOC Commands: 0-->Performs both tasks correctly  2. Best Gaze: 0-->Normal  3. Visual: 0-->No visual loss  4. Facial Palsy: 0-->Normal symmetrical movements  5a. Motor Arm, Left: 0-->No drift, limb holds 90 (or 45) degrees for full 10 secs  5b. Motor Arm, Right: 0-->No drift, limb holds 90 (or 45) degrees for full 10 secs  6a. Motor Leg, Left: 0-->No drift, leg holds 30 degree position for full 5 secs  6b. Motor Leg, Right: 0-->No drift, leg holds 30 degree position for full 5 secs  7. Limb Ataxia: 0-->Absent  8. Sensory: 0-->Normal, no sensory loss  9. Best  Language: 0-->No aphasia, normal  10. Dysarthria: 0-->Normal  11. Extinction and Inattention (formerly Neglect): 0-->No abnormality  Total (NIH Stroke Scale): 0        Modified Cochise Score: 0  Amenia Coma Scale:    ABCD2 Score: 4  [id number] Score:   HAS -BLED Score:   ICH Score:   Hunt & Peterson Classification:       Assessment/Plan:     Diagnostic Results:      Brain Imaging:   MRI brain   No evidence of recent infarction or other acute intracranial pathology.    Modest chronic microvascular ischemic change.    Ventriculomegaly which is slightly out of proportion of degree of sulcal enlargement.  Favor central predominant cerebral volume loss over normal pressure hydrocephalus, but clinical correlation advised.    Vessel Imaging:  CTA head and neck   No acute abnormality. No high-grade stenosis or major vessel occlusion.    Generalized cerebral volume loss.    Mild calcific atherosclerosis of the right carotid bifurcation and distal left vertebral artery.    Cardiac Evaluation:   TTE   · Normal left ventricular systolic function. The estimated ejection fraction is 63%  · Concentric left ventricular remodeling.  · Septal wall has abnormal motion but normal contraction.  · Normal right ventricular systolic function.  · Indeterminate left ventricular diastolic function.  · Mild mitral sclerosis.  · Mild mitral regurgitation.  · Mild tricuspid regurgitation.  · Mild pulmonic regurgitation.  · Normal central venous pressure (3 mm Hg).  · The estimated PA systolic pressure is 28 mm Hg    Interventions: None    Complications: None    Disposition: Home or Self Care    Final Active Diagnoses:    Diagnosis Date Noted POA    PRINCIPAL PROBLEM:  TIA (transient ischemic attack) [G45.9] 06/25/2019 Yes    Depression with anxiety [F41.8] 03/20/2018 Yes    Hyperlipemia [E78.5]  Yes    Hypothyroidism [E03.9] 08/07/2017 Yes    Essential (primary) hypertension [I10] 08/17/2009 Yes      Problems Resolved During this Admission:      * TIA (transient ischemic attack)  72 y/o AAF with multiple risk factors for stroke, p/w transient neurological symptoms x2. ABCD2 score 3 -> 4 with 90-day stroke risk of 9.8%. Will admit for observation and further vascular and cardiac investigation.    MRI negative for acute stroke. Etiology of symptoms likely TIA. Follow up in stroke clinic in 4-6 weeks.     Antithrombotics for secondary stroke prevention: Antiplatelets: Aspirin: 81 mg daily + Plavix 75 mg for 30 days    Statins for secondary stroke prevention and hyperlipidemia, if present:   Statins: Atorvastatin- 20 mg daily    Aggressive risk factor modification: HTN, HLD, Exercise     Rehab efforts: The patient has been evaluated by a stroke team provider and the therapy needs have been fully considered based off the presenting complaints and exam findings. The following therapy evaluations are needed: PT evaluate and treat, OT evaluate and treat, SLP evaluate and treat - home with no needs     Diagnostics ordered/pending: NA    VTE prophylaxis: None: Reason for No Pharmacological VTE Prophylaxis: Ambulating with or without assistance    BP parameters: TIA: normotension         Depression with anxiety  - continue escitalopram 20 mg daily    Hypothyroidism  TSH WNL    - continue home levothyroxine 75 mcg qAM    Hyperlipemia  Stroke risk factor  Patient had been non-compliant with medication for a while  Resumed on atorvastatin 10 mg daily in the beginning of June and increased to 20 a few days ago    - will continue atorvastatin 20 mg daily       Essential (primary) hypertension  On losartan 100 mg daily at home, compliant  sBP elevated to 160 and then 210 upon arrival, requiring Hydralazine IV x1    Home medication continued at discharge         Recommendations:     Post-discharge complication risks: None    Stroke Education given to: family    Follow-up in Stroke Clinic in 4-6 weeks     Discharge Plan:  Antithrombotics: Aspirin 81mg, Clopidogrel  75mg  Statin: Atorvastatin 40mg  Aggresive risk factor modification:  Hypertension  High Cholesterol  Diet  Exercise  Obesity    Follow Up:  Follow-up Information     [facility] VASCULAR NEUROLOGY In 4 weeks.    Specialty:  Vascular Neurology  Why:  Please call to schedule your appointment in 4-6 weeks   Contact information:  [address]  [facility] [address]  [phone]           Velia Molina MD On 7/1/2019.    Specialties:  Gastroenterology, Emergency Medicine  Why:  Hospital Follow-up/ Appt at 12:30pm  Contact information:  [address]  4th [address]  [facility] [address]  [phone]                   Patient Instructions:   No discharge procedures on file.    Medications:  Reconciled Home Medications:      Medication List      START taking these medications    clopidogrel 75 mg tablet  Commonly known as:  PLAVIX  Take 1 tablet (75 mg total) by mouth once daily for 30 days   Start taking on:  6/27/2019        CHANGE how you take these medications    atorvastatin 20 MG tablet  Commonly known as:  LIPITOR  Take 1 tablet (20 mg total) by mouth once daily.  Start taking on:  6/27/2019  What changed:    · medication strength  · how much to take  · how to take this  · when to take this        CONTINUE taking these medications    aspirin 81 MG EC tablet  Commonly known as:  ECOTRIN  Take 81 mg by mouth once daily.     azelastine 137 mcg (0.1 %) nasal spray  Commonly known as:  ASTELIN  2 sprays (274 mcg total) by Nasal route 2 (two) times daily.     diphenhydrAMINE-aluminum-magnesium hydroxide-simethicone-lidocaine HCl 2%  Swish and spit 10 mLs every 4 (four) hours as needed (throat pain).     escitalopram oxalate 20 MG tablet  Commonly known as:  LEXAPRO  Take 20 mg by mouth once daily.     levothyroxine 75 MCG tablet  Commonly known as:  SYNTHROID  Take 75 mcg by mouth once daily.     losartan 100 MG tablet  Commonly known as:  COZAAR  Take 100 mg by mouth once daily.     omega-3 fatty acids  1,000 mg Cap  Take 1 capsule by mouth 2 (two) times daily.     omeprazole 40 MG capsule  Commonly known as:  PRILOSEC  Take 40 mg by mouth once daily.     VITAMIN D2 50,000 unit Cap  Generic drug:  ergocalciferol  Take 50,000 Units by mouth every 7 days.        STOP taking these medications    naproxen 375 MG tablet  Commonly known as:  ALBERT Perea NP  [facility]  Department of Vascular Neurology   [facility]

## 2019-06-26 NOTE — SUBJECTIVE & OBJECTIVE
Past Medical History:   Diagnosis Date    Hyperlipemia     Hypertension     Thyroid disease      Past Surgical History:   Procedure Laterality Date    HERNIA REPAIR       Family History   Problem Relation Age of Onset    Hypertension Mother     Dementia Mother     Heart disease Father      Social History     Tobacco Use    Smoking status: Never Smoker    Smokeless tobacco: Never Used   Substance Use Topics    Alcohol use: No    Drug use: No     Review of patient's allergies indicates:  No Known Allergies    Medications: I have reviewed the current medication administration record.      (Not in a hospital admission)    Review of Systems   Constitutional: Positive for fatigue. Negative for activity change, chills, fever and unexpected weight change. Diaphoresis: exertional.   HENT: Negative for drooling, hearing loss, trouble swallowing and voice change.    Eyes: Negative for photophobia and visual disturbance.   Respiratory: Positive for shortness of breath (exertional). Negative for choking.    Cardiovascular: Negative for chest pain and palpitations.   Gastrointestinal: Negative for abdominal pain, nausea and vomiting.   Genitourinary: Negative for dysuria, frequency and hematuria.   Musculoskeletal: Negative for back pain, gait problem, neck pain and neck stiffness.   Skin: Negative for rash.   Allergic/Immunologic: Negative for immunocompromised state.   Neurological: Positive for weakness (resolved). Negative for dizziness, syncope, speech difficulty, numbness and headaches.   Psychiatric/Behavioral: Negative for agitation, confusion, decreased concentration, hallucinations and sleep disturbance. The patient is not nervous/anxious.      Objective:     Vital Signs (Most Recent):  Temp: 97.2 °F (36.2 °C) (06/25/19 1940)  Pulse: 66 (06/25/19 2148)  Resp: 15 (06/25/19 2148)  BP: 138/68 (06/25/19 2142)  SpO2: 100 % (06/25/19 2148)    Vital Signs Range (Last 24H):  Temp:  [97.2 °F (36.2 °C)]   Pulse:   [60-75]   Resp:  [15-22]   BP: (138-210)/(68-91)   SpO2:  [98 %-100 %]     Physical Exam   Constitutional: She is oriented to person, place, and time. She appears well-developed and well-nourished. She is cooperative. She does not appear ill. No distress.   HENT:   Head: Normocephalic and atraumatic.   Mouth/Throat: Mucous membranes are normal.   Eyes: Pupils are equal, round, and reactive to light. Conjunctivae and EOM are normal.   Neck: Normal range of motion.   Cardiovascular: Normal rate and regular rhythm.   Pulmonary/Chest: Effort normal. No tachypnea. No respiratory distress.   Abdominal: Soft. She exhibits no distension. There is no tenderness.   Musculoskeletal: Normal range of motion. She exhibits no edema.   Neurological: She is alert and oriented to person, place, and time. She displays no seizure activity.   Skin: Skin is warm. She is not diaphoretic. No cyanosis or erythema.   Psychiatric: She has a normal mood and affect. Her speech is normal and behavior is normal.   Nursing note and vitals reviewed.      Neurological Exam:   LOC: alert  Attention Span: Good   Language: No aphasia  Articulation: No dysarthria  Orientation: Person, Place, Time   Visual Fields: Full  EOM (CN III, IV, VI): Full/intact  Pupils (CN II, III): PERRL  Facial Sensation (CN V): Normal  Facial Movement (CN VII): Symmetric facial expression    Motor: 5/5 throughout  Cebellar: No evidence of appendicular or axial ataxia  Sensation: Intact to light touch, temperature and vibration      Laboratory:  CMP:   Recent Labs   Lab 06/25/19 2123   CALCIUM 9.7   ALBUMIN 3.9   PROT 8.2      K 3.7   CO2 22*      BUN 16   CREATININE 1.0   ALKPHOS 113   ALT 15   AST 21   BILITOT 0.2     CBC:   Recent Labs   Lab 06/25/19 2123   WBC 8.73   RBC 4.24   HGB 12.8   HCT 39.3      MCV 93   MCH 30.2   MCHC 32.6     Lipid Panel: No results for input(s): CHOL, LDLCALC, HDL, TRIG in the last 168 hours.  Coagulation: No results for  input(s): PT, INR, APTT in the last 168 hours.  Hgb A1C: No results for input(s): HGBA1C in the last 168 hours.  TSH:   Recent Labs   Lab 06/25/19  2123   TSH 3.228       [facility] lab results (6/4/19):  Chol 229  HDL 39        Diagnostic Results:    CXR (6/25/19):  No cardiomegaly  CP angles clear bilateral  No evidence of pulmonary edema    Brain imaging:  None    Vessel Imaging:  None    Cardiac Evaluation:     ECG (6/25/2019):  Normal sinus rhythm and rate ~60  Normal axis  LBBB  QTc 475, prolonged

## 2019-06-26 NOTE — PLAN OF CARE
06/26/19 1254   Post-Acute Status   Post-Acute Authorization Other   Other Status No Post-Acute Service Needs

## 2019-06-26 NOTE — CONSULTS
Patient was seen by stroke team. She will be admitted to Observation unit for TIA work up. Please see H&P note for further details.      Radha Andino MD  PGY-II, Neurology  Pager: [phone]

## 2019-06-26 NOTE — PT/OT/SLP EVAL
"Occupational Therapy   Evaluation and Discharge Note    Name: Elda Cain  MRN: [id number]  Admitting Diagnosis:  TIA (transient ischemic attack)      Recommendations:     Discharge Recommendations: home  Discharge Equipment Recommendations:  none  Barriers to discharge:  None    Assessment:     Elda Cain is a 73 y.o. female with a medical diagnosis of TIA (transient ischemic attack). At this time, patient is functioning at their prior level of function and does not require further acute OT services.     Plan:     During this hospitalization, patient does not require further acute OT services.  Please re-consult if situation changes.    · Plan of Care Reviewed with: patient, son    Subjective     Patient: "I had a funny sensation in my left leg and foot.  It's been going on for a while.  I've also been having headaches.  Yesterday this left hand gave me trouble.  It was heavy like it wouldn't move."  "I have problems with my left shoulder; they wanted to do surgery on it."    Occupational Profile:  Patient resides in [place] alone (son lives next door) in one story home with 4 steps to enter, rail on right.  PTA patient independent with ADLs, not driving.  Patient is right handed.  Retired: hospital admit supervisor.  Works part time: [employer] at [employer]. Currently owns no DME. Hobbies: watching the shopping channel and shopping.  Roles/Responsibilities:  Mother, grandmother, sister, cooking, household chores.  Family members assist with her transportation needs.     Pain/Comfort:  · Pain Rating 1: 0/10  · Pain Rating Post-Intervention 1: 0/10    Patients cultural, spiritual, Restorationism conflicts given the current situation: (Mormon)    Objective:     Communicated with: Nurse prior to session.  Patient found supine with peripheral IV, telemetry upon OT entry to room.    General Precautions: Standard, NPO, aspiration, fall   Orthopedic Precautions:N/A   Braces: N/A     Occupational Performance:    Bed " Mobility:    · Patient completed Rolling/Turning to Left with  independence  · Patient completed Rolling/Turning to Right with independence  · Patient completed Scooting/Bridging with independence  · Patient completed Supine to Sit with independence  · Patient completed Sit to Supine with independence    Functional Mobility/Transfers:  · Patient completed Sit <> Stand Transfer with independence  with  no assistive device   · Patient completed Bed <> Chair Transfer using Stand Pivot technique with independence with no assistive device    Activities of Daily Living:  · Grooming: independence while standing  · Upper Body Dressing: independence    · Lower Body Dressing: independence     Cognitive/Visual Perceptual:  Cognitive/Psychosocial Skills:     -       Oriented to: Person, Place, Time and Situation   -       Follows Commands/attention:Follows one-step commands  -       Communication: clear/fluent  -       Memory: No Deficits noted  -       Safety awareness/insight to disability: intact   -       Mood/Affect/Coping skills/emotional control: Appropriate to situation and Cooperative    Physical Exam:  Postural examination/scapula alignment:    -       Rounded shoulders  Skin integrity: Visible skin intact  Edema:  None noted  Sensation:    -       Intact  Upper Extremity Range of Motion:     -       Right Upper Extremity: WNL  -       Left Upper Extremity: WNL  Upper Extremity Strength:    -       Right Upper Extremity: WNL  -       Left Upper Extremity: 4/5 proximally    AMPAC 6 Click ADL:  AMPAC Total Score: 24    Treatment & Education:  Patient/ Family education provided for stroke warning signs, prevention guidelines and personal risk factors.  Patient/ Family verbalizing understanding via teach back method.  Patient education provided on role of OT and need for no further OT upon discharge.  Patient alert and oriented x 3; able to follow 4/4 one step commands.  Patient attentive and interactive throughout the  session.  Patient's functional status and disposition recommendation discussed with stroke team in daily rounds.  White board updated in patient's room.  OT asked if there were any other questions; patient/ family had no further questions.   Education:    Patient left supine with all lines intact and call button in reach    GOALS:   Multidisciplinary Problems     Occupational Therapy Goals     Not on file          Multidisciplinary Problems (Resolved)        Problem: Occupational Therapy Goal    Goal Priority Disciplines Outcome Interventions   Occupational Therapy Goal   (Resolved)     OT, PT/OT Outcome(s) achieved    Description:  Goals not set 2* no further OT needed.                    History:     Past Medical History:   Diagnosis Date    Hyperlipemia     Hypertension     Thyroid disease        Past Surgical History:   Procedure Laterality Date    HERNIA REPAIR         Time Tracking:     OT Date of Treatment: 06/26/19  OT Start Time: 0700  OT Stop Time: 0717  OT Total Time (min): 17 min    Billable Minutes:Evaluation 9  Therapeutic Activity 8    KAMRAN Mcdonald  6/26/2019

## 2019-06-26 NOTE — CARE UPDATE
Patient arrived to MRI on tele and placed on MRI safe monitor. Patient alert and oriented x4 and in no apparent distress. . War room notified of above. See vs in computer.

## 2019-06-26 NOTE — PT/OT/SLP EVAL
Physical Therapy Evaluation / discharge    Patient Name:  Elda Cain   MRN:  [id number]    Recommendations:     Discharge Recommendations:  home   Discharge Equipment Recommendations: none   Barriers to discharge: None    Assessment:     Elda Cain is a 73 y.o. female admitted with a medical diagnosis of TIA (transient ischemic attack).  She presents with the following impairments/functional limitations:    Patient transfers walks independently. No additional therapy needs at this time.    Rehab Prognosis: Good; patient would benefit from acute skilled PT services to address these deficits and reach maximum level of function.    Recent Surgery: * No surgery found *      Plan:     During this hospitalization, patient to be seen (eval only) to address the identified rehab impairments via   and progress toward the following goals:    · Plan of Care Expires:  07/26/19    Subjective     Chief Complaint: patient eating breakfast, reports she is no longer NPO and is very hungry.  Patient/Family Comments/goals: return home and to work at [employer]  Pain/Comfort:  · Pain Rating 1: 0/10  · Location - Side 1: Left  · Location 1: shoulder(pain w/ MMT left shoulder, pt reports pre-exisiting. resolved w/ cessation of MMT)  · Pain Rating Post-Intervention 1: 0/10    Patients cultural, spiritual, Buddhism conflicts given the current situation:      Living Environment:  Patient reports lives alone in [facility] w/ 4 LUIZ w/ handrail. 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 Son can assist if needed.  Prior to admission, patients level of function was independent, working.  Equipment used at home: none.  DME owned (not currently used): none.  Upon discharge, patient will have assistance from son can assist if needed.    Objective:     Communicated with patient prior to session.  Patient found sitting up in bed eating breakfast. with telemetry  upon PT entry to room.    General Precautions: Standard, fall, aspiration   Orthopedic  Precautions:    Braces:       Exams:  · Cognitive Exam:  Patient is oriented to Person, Place, Time and Situation  · Postural Exam:  Patient presented with the following abnormalities:    · -       No postural abnormalities identified  · RUE ROM: WFL  · RUE Strength: WFL  · LUE ROM: WFL  · LUE Strength: WFL  · RLE ROM: WNL  · RLE Strength: WNL  · LLE ROM: WNL  · LLE Strength: WNL    Functional Mobility:  · Bed Mobility:     · Supine to Sit: independence  · Transfers:     · Sit to Stand:  independence with no AD  · Bed to Chair: independence with  no AD  using  Step Transfer  · Gait: ambulated 200 feet w/o AD w/ independence. Normal gait mechanics and pace. No LOB.      Therapeutic Activities and Exercises:   patient educated on PT POC of evaluation only;  Review of stroke warning signs and patient verbalizes understanding.      AM-PAC 6 CLICK MOBILITY  Total Score:24     Patient left up in chair with call button in reach and eating lunch.    GOALS:   Multidisciplinary Problems     Physical Therapy Goals        Problem: Physical Therapy Goal    Goal Priority Disciplines Outcome Goal Variances Interventions   Physical Therapy Goal     PT, PT/OT                      History:     Past Medical History:   Diagnosis Date    Hyperlipemia     Hypertension     Thyroid disease        Past Surgical History:   Procedure Laterality Date    HERNIA REPAIR         Time Tracking:     PT Received On: 06/26/19  PT Start Time: 1147     PT Stop Time: 1200  PT Total Time (min): 13 min     Billable Minutes: Evaluation 13      Silke Barr, PT  06/26/2019

## 2019-06-27 NOTE — PLAN OF CARE
06/27/19 1112   Final Note   Assessment Type Final Discharge Note   Anticipated Discharge Disposition Home   Right Care Referral Info   Post Acute Recommendation No Care

## 2019-07-21 RX ORDER — CLOPIDOGREL BISULFATE 75 MG/1
TABLET ORAL
Qty: 30 TABLET | Refills: 0 | OUTPATIENT
Start: 2019-07-21

## 2023-04-03 ENCOUNTER — HOSPITAL ENCOUNTER (EMERGENCY)
Facility: HOSPITAL | Age: 78
Discharge: HOME | End: 2023-04-03
Attending: EMERGENCY MEDICINE
Payer: MEDICARE

## 2023-04-03 ENCOUNTER — OFFICE VISIT (OUTPATIENT)
Dept: URGENT CARE | Facility: CLINIC | Age: 78
End: 2023-04-03
Payer: MEDICARE

## 2023-04-03 VITALS
WEIGHT: 140 LBS | HEART RATE: 61 BPM | RESPIRATION RATE: 16 BRPM | TEMPERATURE: 97 F | DIASTOLIC BLOOD PRESSURE: 78 MMHG | OXYGEN SATURATION: 99 % | BODY MASS INDEX: 23.32 KG/M2 | HEIGHT: 65 IN | SYSTOLIC BLOOD PRESSURE: 135 MMHG

## 2023-04-03 VITALS
TEMPERATURE: 98 F | HEART RATE: 56 BPM | OXYGEN SATURATION: 96 % | SYSTOLIC BLOOD PRESSURE: 149 MMHG | RESPIRATION RATE: 18 BRPM | DIASTOLIC BLOOD PRESSURE: 74 MMHG

## 2023-04-03 DIAGNOSIS — M79.89 LEG SWELLING: Primary | ICD-10-CM

## 2023-04-03 DIAGNOSIS — R60.0 BILATERAL LEG EDEMA: ICD-10-CM

## 2023-04-03 DIAGNOSIS — R06.09 DOE (DYSPNEA ON EXERTION): Primary | ICD-10-CM

## 2023-04-03 DIAGNOSIS — R07.89 CHEST HEAVINESS: ICD-10-CM

## 2023-04-03 DIAGNOSIS — R06.02 SHORTNESS OF BREATH: ICD-10-CM

## 2023-04-03 LAB
ALBUMIN SERPL BCP-MCNC: 4.3 G/DL (ref 3.5–5.2)
ALP SERPL-CCNC: 86 U/L (ref 55–135)
ALT SERPL W/O P-5'-P-CCNC: 24 U/L (ref 10–44)
ANION GAP SERPL CALC-SCNC: 9 MMOL/L (ref 8–16)
AST SERPL-CCNC: 24 U/L (ref 10–40)
BASOPHILS # BLD AUTO: 0.07 K/UL (ref 0–0.2)
BASOPHILS NFR BLD: 0.7 % (ref 0–1.9)
BILIRUB SERPL-MCNC: 0.5 MG/DL (ref 0.1–1)
BNP SERPL-MCNC: 14 PG/ML (ref 0–99)
BUN SERPL-MCNC: 11 MG/DL (ref 8–23)
CALCIUM SERPL-MCNC: 10.1 MG/DL (ref 8.7–10.5)
CHLORIDE SERPL-SCNC: 106 MMOL/L (ref 95–110)
CO2 SERPL-SCNC: 25 MMOL/L (ref 23–29)
CREAT SERPL-MCNC: 0.9 MG/DL (ref 0.5–1.4)
DIFFERENTIAL METHOD: NORMAL
EOSINOPHIL # BLD AUTO: 0.5 K/UL (ref 0–0.5)
EOSINOPHIL NFR BLD: 4.9 % (ref 0–8)
ERYTHROCYTE [DISTWIDTH] IN BLOOD BY AUTOMATED COUNT: 13.9 % (ref 11.5–14.5)
EST. GFR  (NO RACE VARIABLE): >60 ML/MIN/1.73 M^2
GLUCOSE SERPL-MCNC: 79 MG/DL (ref 70–110)
HCT VFR BLD AUTO: 41.2 % (ref 37–48.5)
HGB BLD-MCNC: 13.7 G/DL (ref 12–16)
IMM GRANULOCYTES # BLD AUTO: 0.02 K/UL (ref 0–0.04)
IMM GRANULOCYTES NFR BLD AUTO: 0.2 % (ref 0–0.5)
LYMPHOCYTES # BLD AUTO: 2.9 K/UL (ref 1–4.8)
LYMPHOCYTES NFR BLD: 29.8 % (ref 18–48)
MAGNESIUM SERPL-MCNC: 2 MG/DL (ref 1.6–2.6)
MCH RBC QN AUTO: 30.7 PG (ref 27–31)
MCHC RBC AUTO-ENTMCNC: 33.3 G/DL (ref 32–36)
MCV RBC AUTO: 92 FL (ref 82–98)
MONOCYTES # BLD AUTO: 1 K/UL (ref 0.3–1)
MONOCYTES NFR BLD: 9.7 % (ref 4–15)
NEUTROPHILS # BLD AUTO: 5.4 K/UL (ref 1.8–7.7)
NEUTROPHILS NFR BLD: 54.7 % (ref 38–73)
NRBC BLD-RTO: 0 /100 WBC
PLATELET # BLD AUTO: 262 K/UL (ref 150–450)
PMV BLD AUTO: 10.3 FL (ref 9.2–12.9)
POTASSIUM SERPL-SCNC: 3.9 MMOL/L (ref 3.5–5.1)
PROT SERPL-MCNC: 8.3 G/DL (ref 6–8.4)
RBC # BLD AUTO: 4.46 M/UL (ref 4–5.4)
SODIUM SERPL-SCNC: 140 MMOL/L (ref 136–145)
TROPONIN I SERPL DL<=0.01 NG/ML-MCNC: 0.01 NG/ML (ref 0–0.03)
TSH SERPL DL<=0.005 MIU/L-ACNC: 2.61 UIU/ML (ref 0.4–4)
WBC # BLD AUTO: 9.79 K/UL (ref 3.9–12.7)

## 2023-04-03 PROCEDURE — 99204 PR OFFICE/OUTPT VISIT, NEW, LEVL IV, 45-59 MIN: ICD-10-PCS | Mod: S$GLB,,,

## 2023-04-03 PROCEDURE — 93010 ELECTROCARDIOGRAM REPORT: CPT | Mod: ,,, | Performed by: INTERNAL MEDICINE

## 2023-04-03 PROCEDURE — 84484 ASSAY OF TROPONIN QUANT: CPT | Performed by: EMERGENCY MEDICINE

## 2023-04-03 PROCEDURE — 93005 ELECTROCARDIOGRAM TRACING: CPT

## 2023-04-03 PROCEDURE — 93010 EKG 12-LEAD: ICD-10-PCS | Mod: ,,, | Performed by: INTERNAL MEDICINE

## 2023-04-03 PROCEDURE — 99285 EMERGENCY DEPT VISIT HI MDM: CPT | Mod: 25

## 2023-04-03 PROCEDURE — 83735 ASSAY OF MAGNESIUM: CPT | Performed by: EMERGENCY MEDICINE

## 2023-04-03 PROCEDURE — 85025 COMPLETE CBC W/AUTO DIFF WBC: CPT | Performed by: EMERGENCY MEDICINE

## 2023-04-03 PROCEDURE — 99284 PR EMERGENCY DEPT VISIT,LEVEL IV: ICD-10-PCS | Mod: GC,,, | Performed by: EMERGENCY MEDICINE

## 2023-04-03 PROCEDURE — 63600175 PHARM REV CODE 636 W HCPCS

## 2023-04-03 PROCEDURE — 83880 ASSAY OF NATRIURETIC PEPTIDE: CPT | Performed by: EMERGENCY MEDICINE

## 2023-04-03 PROCEDURE — 99284 EMERGENCY DEPT VISIT MOD MDM: CPT | Mod: GC,,, | Performed by: EMERGENCY MEDICINE

## 2023-04-03 PROCEDURE — 84443 ASSAY THYROID STIM HORMONE: CPT | Performed by: EMERGENCY MEDICINE

## 2023-04-03 PROCEDURE — 80053 COMPREHEN METABOLIC PANEL: CPT | Performed by: EMERGENCY MEDICINE

## 2023-04-03 PROCEDURE — 96374 THER/PROPH/DIAG INJ IV PUSH: CPT

## 2023-04-03 PROCEDURE — 99204 OFFICE O/P NEW MOD 45 MIN: CPT | Mod: S$GLB,,,

## 2023-04-03 RX ORDER — LATANOPROST 50 UG/ML
1 SOLUTION/ DROPS OPHTHALMIC NIGHTLY
COMMUNITY
Start: 2023-02-27

## 2023-04-03 RX ORDER — FERROUS SULFATE, DRIED 160(50) MG
2 TABLET, EXTENDED RELEASE ORAL EVERY MORNING
COMMUNITY

## 2023-04-03 RX ORDER — AMLODIPINE BESYLATE 10 MG/1
10 TABLET ORAL
COMMUNITY
Start: 2023-03-02 | End: 2023-04-13

## 2023-04-03 RX ORDER — FUROSEMIDE 10 MG/ML
20 INJECTION INTRAMUSCULAR; INTRAVENOUS ONCE
Status: COMPLETED | OUTPATIENT
Start: 2023-04-03 | End: 2023-04-03

## 2023-04-03 RX ORDER — AMLODIPINE BESYLATE 10 MG/1
10 TABLET ORAL DAILY
COMMUNITY
Start: 2023-03-02

## 2023-04-03 RX ADMIN — FUROSEMIDE 20 MG: 10 INJECTION, SOLUTION INTRAMUSCULAR; INTRAVENOUS at 10:04

## 2023-04-03 NOTE — Clinical Note
"Elda Pham"Ant was seen and treated in our emergency department on 4/3/2023.  She may return to work on 04/04/2023.       If you have any questions or concerns, please don't hesitate to call.      Titus Bustillos, DO"

## 2023-04-03 NOTE — PROGRESS NOTES
"Subjective:      Patient ID: Elda Cain is a 77 y.o. female.    Vitals:  height is 5' 5" (1.651 m) and weight is 63.5 kg (140 lb). Her oral temperature is 97 °F (36.1 °C). Her blood pressure is 135/78 and her pulse is 61. Her respiration is 16 and oxygen saturation is 99%.     Chief Complaint: Chest Pain    Patient reports to the clinic with the compliant of swelling to both bilateral extremities, tingling to the right arm and some shortness of breath that presented a bout a week ago, she states that when she's sitting she's fine, however she really becomes short of breath when lying down.     Past Medical History:  No date: Hyperlipemia  No date: Hypertension  No date: Thyroid disease    Provider's note begins below:  Pt reports bilateral ankle edema that she first noticed on Friday 3/31/2023. She reports ROSS x a few months. Over the past few days, she has been having midsternal chest heaviness upon waking up in the morning. Pain gets better when she gets up and begins her day. She reports right arm tingling when she lies down at night and noticing she has been dropping objects lately. There is no chest pain, SOB or arm tingling at time of visit. She reports hx of TIA a few years ago and had right arm tingling/weakness. She reports eating more salty foods lately. She reports calling the on call nurse and was advised to come to urgent care for evaluation. No hx of CHF, cardiac disease, stroke in the past.     Chest Pain   This is a new problem. The current episode started 1 to 4 weeks ago. The onset quality is sudden. The problem has been waxing and waning. The pain is present in the substernal region. The pain is at a severity of 5/10. The pain is mild. The quality of the pain is described as heavy and pressure. The pain radiates to the right arm. Associated symptoms include lower extremity edema and shortness of breath. Pertinent negatives include no abdominal pain, back pain, cough, dizziness, fever, " irregular heartbeat, leg pain, numbness, palpitations, PND, sputum production, syncope, vomiting or weakness.     Constitution: Negative for chills, fatigue and fever.   Cardiovascular:  Positive for chest pain, leg swelling and sob on exertion. Negative for palpitations and passing out.   Respiratory:  Positive for shortness of breath. Negative for cough and sputum production.    Gastrointestinal:  Negative for abdominal pain and vomiting.   Musculoskeletal:  Negative for back pain.   Neurological:  Positive for tingling. Negative for dizziness and numbness.    Objective:     Physical Exam   Constitutional: She is oriented to person, place, and time. She appears well-developed. She is cooperative.  Non-toxic appearance. She does not appear ill. No distress.   HENT:   Head: Normocephalic and atraumatic.   Ears:   Right Ear: Hearing, tympanic membrane, external ear and ear canal normal.   Left Ear: Hearing, tympanic membrane, external ear and ear canal normal.   Nose: Nose normal. No mucosal edema, rhinorrhea or nasal deformity. No epistaxis. Right sinus exhibits no maxillary sinus tenderness and no frontal sinus tenderness. Left sinus exhibits no maxillary sinus tenderness and no frontal sinus tenderness.   Mouth/Throat: Uvula is midline, oropharynx is clear and moist and mucous membranes are normal. No trismus in the jaw. Normal dentition. No uvula swelling. No posterior oropharyngeal erythema.   Eyes: Conjunctivae and lids are normal. No visual field deficit is present. Right eye exhibits no discharge. Left eye exhibits no discharge. No scleral icterus.   Neck: Trachea normal and phonation normal. Neck supple.   Cardiovascular: Normal rate, regular rhythm, normal heart sounds and normal pulses.   Pulses:       Dorsalis pedis pulses are 2+ on the right side and 2+ on the left side.   Pulmonary/Chest: Effort normal. No accessory muscle usage. No tachypnea. No respiratory distress. She has no wheezes. She has no  rhonchi. She has rales in the right lower field and the left lower field.   Abdominal: Normal appearance and bowel sounds are normal. She exhibits no distension and no mass. Soft. There is no abdominal tenderness.   Musculoskeletal: Normal range of motion.         General: No deformity. Normal range of motion.      Right lower le+ Pitting Edema present.      Left lower le+ Pitting Edema present.   Neurological: She is alert, oriented to person, place, and time and at baseline. She displays no weakness, facial symmetry and no dysarthria. No cranial nerve deficit or sensory deficit. She exhibits normal muscle tone. She has a normal Finger-Nose-Finger Test. Coordination: Romberg sign positive. Coordination: Heel to shin test normal. She shows no pronator drift. Coordination and gait normal. GCS eye subscore is 4. GCS verbal subscore is 5. GCS motor subscore is 6.   Skin: Skin is warm, dry, intact, not diaphoretic and not pale.   Psychiatric: Her speech is normal and behavior is normal. Judgment and thought content normal.   Nursing note and vitals reviewed.    EKG completed at 1854  Normal sinus rhythm  Possible left atrial enlargement  Nonspecific intraventricular block  Cannot rule out anterior infarct, age undetermined  Abnormal ECG    Vent rate 62 bpm  VA interval 146 ms  QRS duration 140 ms  QT/QTc 466/472 ms  P-R-T axes 68 35 24     No ectopy, no ST elevation or depression.   No obvious change from previous EKG from 2019.     Assessment:     1. ROSS (dyspnea on exertion)    2. Chest heaviness    3. Bilateral leg edema        Plan:       ROSS (dyspnea on exertion)  -     Refer to Emergency Dept.    Chest heaviness  -     Refer to Emergency Dept.    Bilateral leg edema  -     Refer to Emergency Dept.          Medical Decision Making:   Differential Diagnosis:   Congestive heart failure, pleural effusion, acute coronary syndrome, pericarditis       Pt has chest pain and peripheral swelling x a few days. She  has ROSS with lower lung field rales. We do not have x-ray here in the clinic at this time to complete a chest x-ray. Due to the high risk of complications, the patient is being sent to the emergency room for further evaluation, treatment and possible hospitalization. I have reviewed the patients previous visits, labs and images to look for any trends or previous treatments.

## 2023-04-04 NOTE — ED TRIAGE NOTES
Elda Cain, a 77 y.o. female presents to the ED w/ complaint of lower leg swelling with dyspnea on exertion.     Triage note:  Chief Complaint   Patient presents with    Leg Swelling     Sent from  for bilateral leg swelling and pressure in chest x1 day. States feels fine when sitting but has difficulty breathing when laying flat. Denies chest pain. Hx of HTN and TIA in 2019     Review of patient's allergies indicates:  No Known Allergies  Past Medical History:   Diagnosis Date    Hyperlipemia     Hypertension     Thyroid disease       LOC: The patient is awake, alert, aware of environment with an appropriate affect. Oriented x4, speaking appropriately  APPEARANCE: Pt resting comfortably, in no acute distress, pt is clean and well groomed, clothing properly fastened  SKIN:The skin is warm and dry, color consistent with ethnicity, patient has normal skin turgor and moist mucus membranes, no bruising noted   RESPIRATORY:Airway is open and patent, respirations are spontaneous, patient has a normal effort and rate, no accessory muscle use noted.  CARDIAC: bradycardic rate and rhythm, no peripheral edema noted, capillary refill < 3 seconds, bilateral radial pulses 2+.  ABDOMEN: Soft, non tender, non distended.  NEUROLOGIC: PERRLA, facial expression is symmetrical, patient moving all extremities spontaneously, normal sensation in all extremities when touched with a finger.  Follows all commands appropriately  MUSCULOSKELETAL: Patient moving all extremities spontaneously,obvious swelling to bilateral ankles. no deformities noted.

## 2023-04-04 NOTE — ED PROVIDER NOTES
"Encounter Date: 4/3/2023       History     Chief Complaint   Patient presents with    Leg Swelling     Sent from  for bilateral leg swelling and pressure in chest x1 day. States feels fine when sitting but has difficulty breathing when laying flat. Denies chest pain. Hx of HTN and TIA in 2019     Elda Cain is a 77-year-old female with past medical history significant for hypertension, hyperlipidemia presenting to the emergency department with a chief complaint lower extremity edema.  Patient reports that she started to have 4 days ago and also started noticed that she is been having some chest pressure when she lays down flat at night.  She reports that she has some mild shortness of breath exertion he reports that her shortness of breath has been going on for "awhile" .  Patient does not have chest pain currently.  Patient went to an urgent care earlier today and was sent to the ED for an evaluation because she was told she might have fluid in her lungs.  Patient denies prior history of heart failure and prior history of diuretic use.  Per chart review, patient had an echo in 2019 showed EF 63% with indeterminate diastolic dysfunction.  Patient denies recent travels,  prior history of blood clots, smoking, fevers, chills, cough, abdominal distention.        Review of patient's allergies indicates:  No Known Allergies  Past Medical History:   Diagnosis Date    Hyperlipemia     Hypertension     Thyroid disease      Past Surgical History:   Procedure Laterality Date    HERNIA REPAIR       Family History   Problem Relation Age of Onset    Hypertension Mother     Dementia Mother     Heart disease Father      Social History     Tobacco Use    Smoking status: Never     Passive exposure: Never    Smokeless tobacco: Never   Substance Use Topics    Alcohol use: No    Drug use: No     Review of Systems   Respiratory:  Positive for shortness of breath.    Cardiovascular:  Positive for chest pain and leg swelling. "   Gastrointestinal:  Negative for abdominal distention, abdominal pain, diarrhea, nausea and vomiting.     Physical Exam     Initial Vitals [04/03/23 1955]   BP Pulse Resp Temp SpO2   136/84 64 18 98.3 °F (36.8 °C) 100 %      MAP       --         Physical Exam    Nursing note and vitals reviewed.  Constitutional: She appears well-developed. No distress.   HENT:   Head: Normocephalic and atraumatic.   Eyes: Pupils are equal, round, and reactive to light.   Neck:   Normal range of motion.  Cardiovascular:  Normal rate and regular rhythm.           Pulmonary/Chest: Breath sounds normal. No respiratory distress.   Abdominal: Abdomen is soft. She exhibits no distension. There is no abdominal tenderness.   Musculoskeletal:         General: Edema present. No tenderness.      Cervical back: Normal range of motion.     Neurological: She is alert and oriented to person, place, and time.   Skin: Skin is warm and dry.   Psychiatric: She has a normal mood and affect.       ED Course   Procedures  Labs Reviewed   CBC W/ AUTO DIFFERENTIAL   COMPREHENSIVE METABOLIC PANEL   TROPONIN I   B-TYPE NATRIURETIC PEPTIDE   TSH    Narrative:     add on mag per dr rajwinder russo/order#[id number] @21:55 04/03/2023   MAGNESIUM   MAGNESIUM    Narrative:     add on mag per dr rajwinder russo/order#[id number] @21:55 04/03/2023     EKG Readings: (Independently Interpreted)   Normal sinus rhythm, left bundle branch block noted on previous EKGs.  ST depressions in inferior leads improved from prior EKG.  No acute ST/T-wave changes.     Imaging Results              X-Ray Chest AP Portable (Final result)  Result time 04/03/23 22:06:33      Final result by Nakul Marin MD (04/03/23 22:06:33)                   Impression:      No acute process identified on this limited single view.      Electronically signed by: Nakul Marin MD  Date:    04/03/2023  Time:    22:06               Narrative:    EXAMINATION:  XR CHEST AP PORTABLE    CLINICAL  "HISTORY:  Provided history is "CHF;  ".    TECHNIQUE:  One view of the chest.    COMPARISON:  06/25/2019.    FINDINGS:  Lung volumes are low, accentuating basilar markings.  Cardiac silhouette is not enlarged.  No confluent area of consolidation.  No large pleural effusion.  No pneumothorax.                                       Medications   furosemide injection 20 mg (20 mg Intravenous Given 4/3/23 1002)     Medical Decision Making:   Initial Assessment:   77-year-old female with past medical history significant for hypertension, hyperlipidemia, hypothyroidism presenting to the emergency department with shortness of breath, chest pressure, and lower extremity edema which is new for the patient.  Suspicion for heart failure however patient is currently hemodynamically stable.  Differential Diagnosis:   Differential includes but not limited to congestive heart failure, ACS, pulmonary embolism, pneumonia.  Low suspicion for pulmonary embolism as patient nonsmoker, has had no recent travels, and no recent procedures.  Also,  chest pressure not consistent with PE as it is not pleuritic.  Clinical Tests:   Lab Tests: Ordered and Reviewed  Radiological Study: Ordered and Reviewed  Medical Tests: Ordered and Reviewed  ED Management:  Ordered CBC, CMP, troponin, BNP, i-STAT Chem 8, TSH, magnesium, chest x-ray.  CBC, CMP, troponin, BNP, TSH, magnesium unremarkable.  Chest x-ray shows no acute process.  Walk test in the ED showed that oxygen saturations stayed above 95%.  We will give 1 time dose 20 mg of Lasix IV in discharged home PCP follow-up for possible cardiac workup.          Attending Attestation:             Attending ED Notes:   Attending Note:  I have seen the patient, have repeated the key portions of the history and physical, reviewed and agree with the medical documentation, and supervised and managed the medical care of the patient. Additionally, I was present for the critical portion of any procedure(s) " performed.    Patient is a 77-year-old female history hypertension, hyperlipidemia presenting today for bilateral lower extremity swelling as well as shortness of breath and asthma progressed over the past like weeks.  No cough.  No fever or chills.  On exam, lungs are clear, she does have 1+ trace edema on lower extremities.  No calf asymmetry or tenderness.  Labs are unremarkable.  Chest x-ray with no acute process.  Patient treated with Lasix 20 mg p.o given her symptoms.  Recommend patient follow-up with her PCP for outpatient echocardiogram.    HANSA Najera MD  Staff ED Physician  04/03/2023 10:23 PM          ED Course as of 04/03/23 2250   Mon Apr 03, 2023 2154 Troponin I: 0.006 [GM]   2154 BNP: 14 [GM]   2154 WBC: 9.79 [GM]   2154 BUN: 11 [GM]   2154 Creatinine: 0.9 [GM]      ED Course User Index  [GM] Joan Najera MD                 Clinical Impression:   Final diagnoses:  [R06.02] Shortness of breath  [M79.89] Leg swelling (Primary)        ED Disposition Condition    Discharge Stable          ED Prescriptions    None       Follow-up Information       Follow up With Specialties Details Why Contact Info    Krys Rivera MD Internal Medicine Schedule an appointment as soon as possible for a visit in 3 days  [address] [facility]  [address]  [facility] [address]  [phone]               Titus Bustillos DO  Resident  04/03/23 2250       Joan Najera MD  04/04/23 8991

## 2023-04-04 NOTE — DISCHARGE INSTRUCTIONS
You were seen in the hospital for shortness of breath, chest pressure, and swelling.  Your workup in the ED did not show any obvious signs of heart damage.  The swelling in her legs may be due to some underlying heart dysfunction.  We given a dose of an IV diuretic to help you urinate some of the fluid.  Please follow-up with your primary care physician within a week to have further workup such as a echocardiogram.  Please return to the emergency department for evaluation started to have worsening chest pain, shortness of breath cough lightheadedness, worsening swelling, or any other symptoms that is a concern to you.

## 2023-04-04 NOTE — PATIENT INSTRUCTIONS
Please report to the emergency department for further work up for your shortness of breath, chest pain and leg swelling.

## 2023-04-04 NOTE — FIRST PROVIDER EVALUATION
Medical screening examination initiated.  I have conducted a focused provider triage encounter, findings are as follows:    Brief history of present illness:78 y/o c/o  ROSS, leg swelling and orthopnea for 4 days, sent to ED from     Vitals:    04/03/23 1955   BP: 136/84   Pulse: 64   Resp: 18   Temp: 98.3 °F (36.8 °C)   TempSrc: Oral   SpO2: 100%       Pertinent physical exam:  A&O, ambulatory unassisted, speaking in full sentences    Brief workup plan:  CHF workup    Preliminary workup initiated; this workup will be continued and followed by the physician or advanced practice provider that is assigned to the patient when roomed.

## 2023-04-13 ENCOUNTER — HOSPITAL ENCOUNTER (OUTPATIENT)
Facility: HOSPITAL | Age: 78
Discharge: HOME | End: 2023-04-14
Attending: STUDENT IN AN ORGANIZED HEALTH CARE EDUCATION/TRAINING PROGRAM | Admitting: HOSPITALIST
Payer: MEDICARE

## 2023-04-13 DIAGNOSIS — R07.9 CHEST PAIN: ICD-10-CM

## 2023-04-13 LAB
ALBUMIN SERPL BCP-MCNC: 4 G/DL (ref 3.5–5.2)
ALP SERPL-CCNC: 89 U/L (ref 55–135)
ALT SERPL W/O P-5'-P-CCNC: 16 U/L (ref 10–44)
ANION GAP SERPL CALC-SCNC: 10 MMOL/L (ref 8–16)
AST SERPL-CCNC: 19 U/L (ref 10–40)
BASOPHILS # BLD AUTO: 0.07 K/UL (ref 0–0.2)
BASOPHILS NFR BLD: 1.1 % (ref 0–1.9)
BILIRUB SERPL-MCNC: 0.5 MG/DL (ref 0.1–1)
BUN SERPL-MCNC: 18 MG/DL (ref 8–23)
CALCIUM SERPL-MCNC: 9.7 MG/DL (ref 8.7–10.5)
CHLORIDE SERPL-SCNC: 107 MMOL/L (ref 95–110)
CO2 SERPL-SCNC: 22 MMOL/L (ref 23–29)
CREAT SERPL-MCNC: 0.9 MG/DL (ref 0.5–1.4)
DIFFERENTIAL METHOD: NORMAL
EOSINOPHIL # BLD AUTO: 0.3 K/UL (ref 0–0.5)
EOSINOPHIL NFR BLD: 4.2 % (ref 0–8)
ERYTHROCYTE [DISTWIDTH] IN BLOOD BY AUTOMATED COUNT: 13.4 % (ref 11.5–14.5)
EST. GFR  (NO RACE VARIABLE): >60 ML/MIN/1.73 M^2
ESTIMATED AVG GLUCOSE: 114 MG/DL (ref 68–131)
FOLATE SERPL-MCNC: 16.8 NG/ML (ref 4–24)
GLUCOSE SERPL-MCNC: 108 MG/DL (ref 70–110)
HBA1C MFR BLD: 5.6 % (ref 4–5.6)
HCT VFR BLD AUTO: 40.5 % (ref 37–48.5)
HCV AB SERPL QL IA: NORMAL
HGB BLD-MCNC: 13.5 G/DL (ref 12–16)
HIV 1+2 AB+HIV1 P24 AG SERPL QL IA: NORMAL
IMM GRANULOCYTES # BLD AUTO: 0.02 K/UL (ref 0–0.04)
IMM GRANULOCYTES NFR BLD AUTO: 0.3 % (ref 0–0.5)
LYMPHOCYTES # BLD AUTO: 2.2 K/UL (ref 1–4.8)
LYMPHOCYTES NFR BLD: 36.1 % (ref 18–48)
MCH RBC QN AUTO: 29.9 PG (ref 27–31)
MCHC RBC AUTO-ENTMCNC: 33.3 G/DL (ref 32–36)
MCV RBC AUTO: 90 FL (ref 82–98)
MONOCYTES # BLD AUTO: 0.6 K/UL (ref 0.3–1)
MONOCYTES NFR BLD: 8.9 % (ref 4–15)
NEUTROPHILS # BLD AUTO: 3 K/UL (ref 1.8–7.7)
NEUTROPHILS NFR BLD: 49.4 % (ref 38–73)
NRBC BLD-RTO: 0 /100 WBC
PLATELET # BLD AUTO: 238 K/UL (ref 150–450)
PMV BLD AUTO: 10.3 FL (ref 9.2–12.9)
POC CARDIAC TROPONIN I: 0 NG/ML (ref 0–0.08)
POC CARDIAC TROPONIN I: 0 NG/ML (ref 0–0.08)
POTASSIUM SERPL-SCNC: 3.8 MMOL/L (ref 3.5–5.1)
PROT SERPL-MCNC: 8 G/DL (ref 6–8.4)
RBC # BLD AUTO: 4.52 M/UL (ref 4–5.4)
SAMPLE: NORMAL
SAMPLE: NORMAL
SODIUM SERPL-SCNC: 139 MMOL/L (ref 136–145)
TROPONIN I SERPL DL<=0.01 NG/ML-MCNC: 0.01 NG/ML (ref 0–0.03)
TROPONIN I SERPL DL<=0.01 NG/ML-MCNC: <0.006 NG/ML (ref 0–0.03)
TROPONIN I SERPL DL<=0.01 NG/ML-MCNC: <0.006 NG/ML (ref 0–0.03)
VIT B12 SERPL-MCNC: 719 PG/ML (ref 210–950)
WBC # BLD AUTO: 6.15 K/UL (ref 3.9–12.7)

## 2023-04-13 PROCEDURE — 84484 ASSAY OF TROPONIN QUANT: CPT | Mod: 91 | Performed by: HOSPITALIST

## 2023-04-13 PROCEDURE — 82746 ASSAY OF FOLIC ACID SERUM: CPT | Performed by: HOSPITALIST

## 2023-04-13 PROCEDURE — 96372 THER/PROPH/DIAG INJ SC/IM: CPT | Performed by: HOSPITALIST

## 2023-04-13 PROCEDURE — 84484 ASSAY OF TROPONIN QUANT: CPT | Performed by: STUDENT IN AN ORGANIZED HEALTH CARE EDUCATION/TRAINING PROGRAM

## 2023-04-13 PROCEDURE — 99223 1ST HOSP IP/OBS HIGH 75: CPT | Mod: AI,,, | Performed by: HOSPITALIST

## 2023-04-13 PROCEDURE — 83036 HEMOGLOBIN GLYCOSYLATED A1C: CPT | Performed by: HOSPITALIST

## 2023-04-13 PROCEDURE — 84484 ASSAY OF TROPONIN QUANT: CPT

## 2023-04-13 PROCEDURE — G0378 HOSPITAL OBSERVATION PER HR: HCPCS

## 2023-04-13 PROCEDURE — 25000003 PHARM REV CODE 250: Performed by: HOSPITALIST

## 2023-04-13 PROCEDURE — 93005 ELECTROCARDIOGRAM TRACING: CPT

## 2023-04-13 PROCEDURE — 99223 PR INITIAL HOSPITAL CARE,LEVL III: ICD-10-PCS | Mod: AI,,, | Performed by: HOSPITALIST

## 2023-04-13 PROCEDURE — 63600175 PHARM REV CODE 636 W HCPCS: Performed by: HOSPITALIST

## 2023-04-13 PROCEDURE — 85025 COMPLETE CBC W/AUTO DIFF WBC: CPT | Performed by: STUDENT IN AN ORGANIZED HEALTH CARE EDUCATION/TRAINING PROGRAM

## 2023-04-13 PROCEDURE — 99285 EMERGENCY DEPT VISIT HI MDM: CPT | Mod: ,,, | Performed by: STUDENT IN AN ORGANIZED HEALTH CARE EDUCATION/TRAINING PROGRAM

## 2023-04-13 PROCEDURE — 82607 VITAMIN B-12: CPT | Performed by: HOSPITALIST

## 2023-04-13 PROCEDURE — 25000003 PHARM REV CODE 250: Performed by: STUDENT IN AN ORGANIZED HEALTH CARE EDUCATION/TRAINING PROGRAM

## 2023-04-13 PROCEDURE — 99285 PR EMERGENCY DEPT VISIT,LEVEL V: ICD-10-PCS | Mod: ,,, | Performed by: STUDENT IN AN ORGANIZED HEALTH CARE EDUCATION/TRAINING PROGRAM

## 2023-04-13 PROCEDURE — 99285 EMERGENCY DEPT VISIT HI MDM: CPT | Mod: 25

## 2023-04-13 PROCEDURE — 93010 ELECTROCARDIOGRAM REPORT: CPT | Mod: ,,, | Performed by: INTERNAL MEDICINE

## 2023-04-13 PROCEDURE — 86803 HEPATITIS C AB TEST: CPT | Performed by: EMERGENCY MEDICINE

## 2023-04-13 PROCEDURE — 93010 EKG 12-LEAD: ICD-10-PCS | Mod: ,,, | Performed by: INTERNAL MEDICINE

## 2023-04-13 PROCEDURE — 80053 COMPREHEN METABOLIC PANEL: CPT | Performed by: STUDENT IN AN ORGANIZED HEALTH CARE EDUCATION/TRAINING PROGRAM

## 2023-04-13 PROCEDURE — 87389 HIV-1 AG W/HIV-1&-2 AB AG IA: CPT | Performed by: EMERGENCY MEDICINE

## 2023-04-13 RX ORDER — ASPIRIN 325 MG
325 TABLET ORAL
Status: COMPLETED | OUTPATIENT
Start: 2023-04-13 | End: 2023-04-13

## 2023-04-13 RX ORDER — AMLODIPINE BESYLATE 10 MG/1
10 TABLET ORAL DAILY
Status: DISCONTINUED | OUTPATIENT
Start: 2023-04-14 | End: 2023-04-14 | Stop reason: HOSPADM

## 2023-04-13 RX ORDER — ATORVASTATIN CALCIUM 20 MG/1
20 TABLET, FILM COATED ORAL DAILY
Status: DISCONTINUED | OUTPATIENT
Start: 2023-04-14 | End: 2023-04-14

## 2023-04-13 RX ORDER — ENOXAPARIN SODIUM 100 MG/ML
40 INJECTION SUBCUTANEOUS EVERY 24 HOURS
Status: DISCONTINUED | OUTPATIENT
Start: 2023-04-13 | End: 2023-04-14 | Stop reason: HOSPADM

## 2023-04-13 RX ORDER — SODIUM CHLORIDE 0.9 % (FLUSH) 0.9 %
10 SYRINGE (ML) INJECTION EVERY 12 HOURS PRN
Status: DISCONTINUED | OUTPATIENT
Start: 2023-04-13 | End: 2023-04-14 | Stop reason: HOSPADM

## 2023-04-13 RX ORDER — ESCITALOPRAM OXALATE 10 MG/1
10 TABLET ORAL DAILY
Status: DISCONTINUED | OUTPATIENT
Start: 2023-04-14 | End: 2023-04-14 | Stop reason: HOSPADM

## 2023-04-13 RX ORDER — LIDOCAINE HYDROCHLORIDE 20 MG/ML
15 SOLUTION OROPHARYNGEAL ONCE
Status: COMPLETED | OUTPATIENT
Start: 2023-04-13 | End: 2023-04-13

## 2023-04-13 RX ORDER — DEXTROSE 40 %
15 GEL (GRAM) ORAL
Status: DISCONTINUED | OUTPATIENT
Start: 2023-04-13 | End: 2023-04-14 | Stop reason: HOSPADM

## 2023-04-13 RX ORDER — LOSARTAN POTASSIUM 50 MG/1
100 TABLET ORAL DAILY
Status: DISCONTINUED | OUTPATIENT
Start: 2023-04-14 | End: 2023-04-14 | Stop reason: HOSPADM

## 2023-04-13 RX ORDER — ONDANSETRON 2 MG/ML
4 INJECTION INTRAMUSCULAR; INTRAVENOUS
Status: DISPENSED | OUTPATIENT
Start: 2023-04-13 | End: 2023-04-13

## 2023-04-13 RX ORDER — ASPIRIN 81 MG/1
81 TABLET ORAL DAILY
Status: DISCONTINUED | OUTPATIENT
Start: 2023-04-14 | End: 2023-04-14 | Stop reason: HOSPADM

## 2023-04-13 RX ORDER — NAPROXEN SODIUM 220 MG
440 TABLET ORAL DAILY PRN
COMMUNITY

## 2023-04-13 RX ORDER — MAG HYDROX/ALUMINUM HYD/SIMETH 200-200-20
30 SUSPENSION, ORAL (FINAL DOSE FORM) ORAL ONCE
Status: COMPLETED | OUTPATIENT
Start: 2023-04-13 | End: 2023-04-13

## 2023-04-13 RX ORDER — LATANOPROST 50 UG/ML
1 SOLUTION/ DROPS OPHTHALMIC NIGHTLY
Status: DISCONTINUED | OUTPATIENT
Start: 2023-04-13 | End: 2023-04-14 | Stop reason: HOSPADM

## 2023-04-13 RX ORDER — DEXTROSE 40 %
30 GEL (GRAM) ORAL
Status: DISCONTINUED | OUTPATIENT
Start: 2023-04-13 | End: 2023-04-14 | Stop reason: HOSPADM

## 2023-04-13 RX ORDER — LEVOTHYROXINE SODIUM 75 UG/1
75 TABLET ORAL DAILY
Status: DISCONTINUED | OUTPATIENT
Start: 2023-04-14 | End: 2023-04-14 | Stop reason: HOSPADM

## 2023-04-13 RX ORDER — NALOXONE HCL 0.4 MG/ML
0.02 VIAL (ML) INJECTION
Status: DISCONTINUED | OUTPATIENT
Start: 2023-04-13 | End: 2023-04-14 | Stop reason: HOSPADM

## 2023-04-13 RX ADMIN — ENOXAPARIN SODIUM 40 MG: 40 INJECTION SUBCUTANEOUS at 05:04

## 2023-04-13 RX ADMIN — LATANOPROST 1 DROP: 50 SOLUTION OPHTHALMIC at 09:04

## 2023-04-13 RX ADMIN — ASPIRIN 325 MG ORAL TABLET 325 MG: 325 PILL ORAL at 07:04

## 2023-04-13 RX ADMIN — ALUMINUM HYDROXIDE, MAGNESIUM HYDROXIDE, AND SIMETHICONE 30 ML: 200; 200; 20 SUSPENSION ORAL at 07:04

## 2023-04-13 RX ADMIN — LIDOCAINE HYDROCHLORIDE 15 ML: 20 SOLUTION ORAL; TOPICAL at 07:04

## 2023-04-13 NOTE — ED NOTES
"Surgery Consultation, [facility]         Jac Garrett MD    Ethel De Luna MRN# [id number]   YOB: 1971 Age: 46 year old     PCP:  Merry Ying [phone]    Chief Complaint:  Right upper quadrant pain, nausea    History of Present Illness:  Ethel De Luna is a 46 year old female who presented with several episodes of upper abdominal pain and intermittent nausea, usually after eating.  Commonly associated with eating greasy foods.  Pain radiates into the back and shoulder.  Was seen in ER some weeks ago, labs were fairly normal.  RUQ U/S was obtained and this showed thickened GB wall, possible stone in neck(scan was poor quality).  The patient is now here to discuss diagnosis and management options.    PMH:  Ethel De Luna  has a past medical history of Fibroid of cervix and Infertility.  PSH:  Ethel De Luna  has a past surgical history that includes orthopedic surgery;  section (N/A, 2015); and  section (N/A, 3/20/2017).    Home medications and allergies reviewed.    Social History:  Ethel De Luna  reports that she has quit smoking. Her smoking use included Cigarettes. She has a 10.00 pack-year smoking history. She has never used smokeless tobacco. She reports that she does not drink alcohol or use illicit drugs.  Family History:  Ethel De Luna family history is not on file.    ROS:  The 10 point Review of Systems is negative other than noted in the HPI.  Nausea, pain as described.    Physical Exam:  Blood pressure 113/70, pulse 66, height 5' 4\" (1.626 m), weight 145 lb (65.8 kg), last menstrual period 2017, currently breastfeeding.  145 lbs 0 oz  Healthy female in no distress.  Patient has a pleasant affect, speaks without difficulty.   Pupils equal round and reactive to light.   No cervical lymphadenopathy or thyromegaly.   Lung fields clear, breathing comfortably.   Heart normal sinus " Patient care assumed at this time. Patient placed in hospital gown at this time. Patient continued on cardiac monitor, bp cuff, and continuous pulse ox. Patient on room air, denies shortness of breath. Patient denies any pain at this time, states only symptoms she is currently having is tingling to both hands and feet. White board updated with patient plan of care. Call light within reach.    rhythm.  No murmurs rubs or gallops.  Abdomen soft, nontender, nondistended.  Minimal tenderness in the right upper quadrant, no masses appreciated. No peritoneal signs or rebound.  Skin warm, dry, without rashes or lesions.    All new lab and imaging data was reviewed.  Abdominal ultrasound shows gallbladder wall thickening, probable gallstone.     Assessment/plan:  Ethel De Luna is a 46 year old female with signs and symptoms suggesting symptomatic cholelithiasis. I've recommended laparoscopic cholecystectomy. Surgical comorbidities include previous .  She had questions about the certainty of the diagnosis, and I reassured her I felt it was a classic presentation.  If she would like, a referral for HIDA scan or GI consult could be ordered.   I feel the patient is a good candidate for the surgery and that this should be able to be done as an outpatient. They were going to consider their options and likely schedule surgery.    Gatito Garrett M.D.  [facility]  [phone]    Please route or send letter to:  Primary Care Provider (PCP) and Referring Provider

## 2023-04-13 NOTE — ED NOTES
Assumed care for pt. Pt. resting in bed in NAD. RR e/u. VS being monitored per frequency of MD orders. Vital signs stable at this time of assessment. Explanation of care/wait provided. Bed in low, locked position with rails up and call bell in reach. Will continue to monitor.     Pt's visitor at bedside updated on plan of care, white board updated with today's care team and plan.      Patient identifiers for Elda Cain 77 y.o. female checked and correct.  Chief Complaint   Patient presents with    Tingling     Numbness and tingling in hands and feet while sleeping for the past week     Past Medical History:   Diagnosis Date    Hyperlipemia     Hypertension     Thyroid disease      Allergies reported: Review of patient's allergies indicates:  No Known Allergies    Pt reports tingling in extremities over the last few weeks.    LOC: Patient is awake, alert, and aware of environment with an appropriate affect. Patient is oriented x 4 and speaking appropriately.  APPEARANCE: Patient resting comfortably and in no acute distress. Patient is clean and well groomed, patient's clothing is properly fastened.  HEENT: WDL  SKIN: The skin is warm and dry. Patient has normal skin turgor and moist mucus membranes.   MUSKULOSKELETAL: Patient is moving all extremities well, no obvious deformities noted. Pulses intact.   RESPIRATORY: Airway is open and patent. Respirations are spontaneous and non-labored with normal effort and rate.  CARDIAC: Patient has a normal rate and rhythm. 73 on cardiac monitor. No peripheral edema noted.   ABDOMEN: No distention noted. Soft and non-tender upon palpation.  NEUROLOGICAL: pupils 3 mm, PERRL. Facial expression is symmetrical. Hand grasps are equal bilaterally. Normal sensation in all extremities when touched with finger.

## 2023-04-13 NOTE — ED TRIAGE NOTES
Pt states her hands and feet have been tingling for a while but has gotten worse in the last week. Pt states feeling a heaviness in her chest along with nausea and having blurred vision in the left eye.

## 2023-04-13 NOTE — H&P
[facility] Medicine  History & Physical    Admitting Team: IM-C  Attending Physician: Khushi Vazquez MD  Date of Admit: 4/13/2023    Chief Complaint     CP x 4 days    Subjective:      History of Present Illness:  Elda Cain is a 77 y.o. female with hypertension, hyperlipidemia and thyroid disease presenting to ED with complaint of moderate non-radiating chest heaviness while sleeping over the past couple weeks.  She states it resolves with standing and walking around.  Denies diaphoresis or nausea. She has had associated decreased appetite. Reports chest pain may have had some improvement with GI cocktail. Denies heartburn or indigestion. Also reports SOB with exertion that improves with rest. She also reports intermittent tingling in her bilateral hands & feet which occurs throughout the day. Patient states that she initially went to urgent care a week ago and was referred to [facility] ED where she received a dose of Lasix for swelling of her feet.  Since that time, she states the swelling has not returned.  She was seen by her cardiologist on 4/11 who obtained an echo that was normal.  She is scheduled for a stress test later this month. She denies vision changes, headache, fevers, chills, shortness of breath, unilateral weakness. Reports not eating a healthy diet at home; what she eats is not much substance; not much protein.       Past Medical History:  Past Medical History:   Diagnosis Date    Hyperlipemia     Hypertension     Thyroid disease        Past Surgical History:  Past Surgical History:   Procedure Laterality Date    HERNIA REPAIR         Allergies:  Review of patient's allergies indicates:  No Known Allergies    Home Medications:  Prior to Admission medications    Medication Sig Start Date End Date Taking? Authorizing Provider   amLODIPine (NORVASC) 10 MG tablet Take 10 mg by mouth once daily. 3/2/23  Yes Historical Provider   ascorbic acid (VITAMIN C ORAL) Take 1  capsule by mouth every morning.   Yes Historical Provider   ascorbic acid/zinc (ZINC WITH VITAMIN C ORAL) Take 1 tablet by mouth every morning.   Yes Historical Provider   aspirin (ECOTRIN) 81 MG EC tablet Take 81 mg by mouth once daily.   Yes Historical Provider   atorvastatin (LIPITOR) 20 MG tablet Take 1 tablet (20 mg total) by mouth once daily. 6/27/19 4/13/23 Yes Diana Perea NP   calcium-vitamin D3 (OS-MECHE 500 + D3) 500 mg-5 mcg (200 unit) per tablet Take 2 tablets by mouth every morning.   Yes Historical Provider   chol/gl/ser/RNA/phen/prg/hb150 (SHARPER FOCUS ORAL) Take 2 capsules by mouth every morning.   Yes Historical Provider   EScitalopram oxalate (LEXAPRO) 10 MG tablet Take 10 mg by mouth once daily.   Yes Historical Provider   latanoprost 0.005 % ophthalmic solution Place 1 drop into both eyes every evening. 2/27/23  Yes Historical Provider   levothyroxine (SYNTHROID) 75 MCG tablet Take 75 mcg by mouth once daily.   Yes Historical Provider   losartan (COZAAR) 100 MG tablet Take 100 mg by mouth once daily.   Yes Historical Provider   multivitamin capsule Take 1 capsule by mouth once daily.   Yes Historical Provider   naproxen sodium (ANAPROX) 220 MG tablet Take 440 mg by mouth daily as needed (SHOULDER PAIN).   Yes Historical Provider   omega-3 fatty acids 1,000 mg Cap Take 2 capsules by mouth once daily.   Yes Historical Provider   RED YEAST RICE ORAL Take 2 capsules by mouth every morning.   Yes Historical Provider   amLODIPine (NORVASC) 10 MG tablet Take 10 mg by mouth. 3/2/23 4/13/23  Historical Provider   azelastine (ASTELIN) 137 mcg (0.1 %) nasal spray 2 sprays (274 mcg total) by Nasal route 2 (two) times daily. 3/20/19 4/13/23  Filipe Quarles PA-C   clopidogrel (PLAVIX) 75 mg tablet Take 1 tablet (75 mg total) by mouth once daily. 6/27/19 4/13/23  Diana Perea NP   diphenhydrAMINE-aluminum-magnesium hydroxide-simethicone-lidocaine HCl 2% Swish and spit 10 mLs every 4 (four) hours as  needed (throat pain).  Patient not taking: Reported on 4/3/2023 3/20/19 4/13/23  Filipe Quarles PA-C   ergocalciferol (ERGOCALCIFEROL) 50,000 unit Cap Take 50,000 Units by mouth every 7 days.  23  Historical Provider   omeprazole (PRILOSEC) 40 MG capsule Take 40 mg by mouth once daily.  23  Historical Provider       Family History:  Family History   Problem Relation Age of Onset    Hypertension Mother     Dementia Mother     Heart disease Father        Social History:  Social History     Tobacco Use    Smoking status: Never     Passive exposure: Never    Smokeless tobacco: Never   Substance Use Topics    Alcohol use: No    Drug use: No       Review of Systems:  As per HPI  General: no fever, no chills, no weight loss, no fatigue  Nose, Sinuses: no rhinorrhea, no facial pain, no epistaxis  Cardiovascular: no chest pain, no orthopnea, no dyspnea  Respiratory: no cough, no wheezes, no chest pain  Urinary: no dysuria, no increased frequency, no hematuria  Hematologic: no easy bruising, no overt bleeding, no petechiae  Endocrine: no heat intolerance, no cold intolerance, no polyuria  Neurologic: no seizures, no tremors, no numbness  Psychiatric: no hallucination, no depression, no suicidal ideation  Skin: no rashes, no pruritus, no pallor  All other systems reviewed & are negative.        Objective:   Last 24 Hour Vital Signs:  BP  Min: 122/59  Max: 168/76  Temp  Av.9 °F (36.6 °C)  Min: 97.6 °F (36.4 °C)  Max: 98.4 °F (36.9 °C)  Pulse  Av.5  Min: 57  Max: 73  Resp  Av.1  Min: 14  Max: 20  SpO2  Av.4 %  Min: 96 %  Max: 100 %  Weight  Av.5 kg (140 lb)  Min: 63.5 kg (140 lb)  Max: 63.5 kg (140 lb)  Body mass index is 23.3 kg/m².  No intake/output data recorded.    Physical Examination:  GEN: AAOx3, NAD  HEENT: NCAT, MMM, PERRL, EOMI, oropharynx clear  CV: RRR, no m/r  RESP: CTAB, no wheezes/crackles  ABD: soft, NTND, normoactive BS, no organomegaly  EXTR: no c/c/e, 2+ distal pulses x  4  NEURO: PERRL, EOMI, 5/5 motor strength all four extremities, intact sensation to light touch, no focal deficits  SKIN: no rashes, lesions, or color changes  PSYCH: normal affect     Laboratory:  I have reviewed all pertinent laboratory data within the past 24 hours.    Other Results:  EKG (my interpretation): TWI inferior leads    Radiology:  I have reviewed all pertinent radiology imaging within the past 24 hours.    Prior records reviewed.     Assessment/Plan:     Elda Cain is a 77 y.o. female with:    Chest pain: trop negative x 2; trend. ECG with inferior TWIs. ASA, statin. Nuclear stress in AM. Nitro prn.    HLD: check FLP, A1C. Continue statin    HTN: elevated; continue home BP meds; may need titration    Hypothyroidism: recent TSH wnl; cont synthroid    Neuropathy: b/l feet & hands. Check A1C, B12, folate.     Khushi Vazquez MD  Hospital Medicine Staff  [facility]

## 2023-04-13 NOTE — ED NOTES
MD Hebert and MD Sara notified patient wants to hold medications at this time. Patient states she is not experiencing symptoms at this time.

## 2023-04-13 NOTE — PHARMACY MED REC
"Admission Medication History     The home medication history was taken by Shalia Whitney.    You may go to "Admission" then "Reconcile Home Medications" tabs to review and/or act upon these items.     The home medication list has been updated by the Pharmacy department.   Please read ALL comments highlighted in yellow.   Please address this information as you see fit.    Feel free to contact us if you have any questions or require assistance.      The medications listed below were removed from the home medication list. Please reorder if appropriate:  Patient reports no longer taking the following medication(s):  AZELASTINE 137 MCG NASAL SPRAY  CLOPIDOGREL 75 MG   MAGIC MOUTHWASH  ERGOCALCIFEROL 50,000 UNIT  OMEPRAZOLE 40 MG       Medications listed below were obtained from: Patient/family  Current Outpatient Medications on File Prior to Encounter   Medication Sig    amLODIPine (NORVASC) 10 MG tablet   Take 10 mg by mouth once daily.    ascorbic acid (VITAMIN C ORAL)   Take 1 capsule by mouth every morning.    ascorbic acid/zinc (ZINC WITH VITAMIN C ORAL)   Take 1 tablet by mouth every morning.    aspirin (ECOTRIN) 81 MG EC tablet   Take 81 mg by mouth once daily.    atorvastatin (LIPITOR) 20 MG tablet   Take 1 tablet (20 mg total) by mouth once daily.    calcium-vitamin D3 (OS-MECHE 500 + D3) 500 mg-5 mcg (200 unit) per tablet   Take 2 tablets by mouth every morning.    chol/gl/ser/RNA/phen/prg/hb150 (SHARPER FOCUS ORAL)   Take 2 capsules by mouth every morning.    EScitalopram oxalate (LEXAPRO) 10 MG tablet   Take 10 mg by mouth once daily.    latanoprost 0.005 % ophthalmic solution   Place 1 drop into both eyes every evening.    levothyroxine (SYNTHROID) 75 MCG tablet   Take 75 mcg by mouth once daily.    losartan (COZAAR) 100 MG tablet   Take 100 mg by mouth once daily.    multivitamin capsule   Take 1 capsule by mouth once daily.    naproxen sodium (ANAPROX) 220 MG tablet   Take 440 mg by mouth daily as needed " (SHOULDER PAIN).    omega-3 fatty acids 1,000 mg Cap   Take 2 capsules by mouth once daily.    RED YEAST RICE ORAL Take 2 capsules by mouth every morning.             Shaila Whitney  EXT [id number]                  .

## 2023-04-13 NOTE — ED PROVIDER NOTES
Encounter Date: 4/13/2023       History     Chief Complaint   Patient presents with    Tingling     Numbness and tingling in hands and feet while sleeping for the past week     77-year-old female with past medical history of hypertension, hyperlipidemia and thyroid disease presenting to ED with complaint of chest heaviness and burning while sleeping over the past couple weeks.  She states it resolves with standing and walking around.  She has had associated decreased appetite and mild nausea.  She also reports intermittent tingling in her bilateral hands and feet which occurs throughout the day.  Patient states that she initially went to urgent care a week ago and was referred to [facility] ED where she received a dose of Lasix for swelling of her feet.  Since that time, she states the swelling has not returned.  She was seen by her cardiologist on 4/11 who obtained an echo that was normal.  She is scheduled for a stress test later this month. She denies vision changes, headache, fevers, chills, shortness of breath, unilateral weakness.    Review of patient's allergies indicates:  No Known Allergies  Past Medical History:   Diagnosis Date    Hyperlipemia     Hypertension     Thyroid disease      Past Surgical History:   Procedure Laterality Date    HERNIA REPAIR       Family History   Problem Relation Age of Onset    Hypertension Mother     Dementia Mother     Heart disease Father      Social History     Tobacco Use    Smoking status: Never     Passive exposure: Never    Smokeless tobacco: Never   Substance Use Topics    Alcohol use: No    Drug use: No     Review of Systems   Constitutional:  Negative for chills and fever.   HENT:  Negative for congestion and rhinorrhea.    Eyes:  Negative for pain and visual disturbance.   Respiratory:  Negative for cough and shortness of breath.    Cardiovascular:  Positive for chest pain (Burning and heaviness with lying down). Negative for palpitations.   Gastrointestinal:  Positive  for nausea. Negative for abdominal pain and vomiting.   Genitourinary:  Negative for difficulty urinating and dysuria.   Musculoskeletal:  Negative for gait problem and joint swelling.   Skin:  Negative for rash and wound.   Neurological:  Positive for numbness (Bilateral hands and feet intermittently). Negative for weakness and headaches.     Physical Exam     Initial Vitals [04/13/23 0559]   BP Pulse Resp Temp SpO2   (!) 152/71 71 16 97.6 °F (36.4 °C) 97 %      MAP       --         Physical Exam    Nursing note and vitals reviewed.  Constitutional: She is not diaphoretic. No distress.   HENT:   Head: Normocephalic and atraumatic.   Mouth/Throat: Oropharynx is clear and moist.   Eyes: Conjunctivae and EOM are normal.   Neck: Neck supple.   Normal range of motion.  Cardiovascular:  Normal rate, regular rhythm and normal heart sounds.           Pulmonary/Chest: Breath sounds normal. She has no wheezes. She has no rhonchi. She has no rales.   Abdominal: Abdomen is soft. She exhibits no distension. There is no abdominal tenderness.   Musculoskeletal:         General: No edema. Normal range of motion.      Cervical back: Normal range of motion and neck supple.     Neurological: She is alert and oriented to person, place, and time. She has normal strength. No cranial nerve deficit or sensory deficit.   Skin: Skin is warm and dry. Capillary refill takes less than 2 seconds.       ED Course   Procedures  Labs Reviewed   COMPREHENSIVE METABOLIC PANEL - Abnormal; Notable for the following components:       Result Value    CO2 22 (*)     All other components within normal limits   HIV 1 / 2 ANTIBODY    Narrative:     Release to patient->Immediate   HEPATITIS C ANTIBODY    Narrative:     Release to patient->Immediate   CBC W/ AUTO DIFFERENTIAL   TROPONIN I   TROPONIN ISTAT   TROPONIN I   TROPONIN ISTAT   B-TYPE NATRIURETIC PEPTIDE   POCT TROPONIN   POCT TROPONIN        ECG Results              EKG 12-lead (Final result)   "Result time 04/13/23 09:12:09      Final result by Interface, Lab In [facility] (04/13/23 09:12:09)                   Narrative:    Test Reason : R07.9,    Vent. Rate : 062 BPM     Atrial Rate : 062 BPM     P-R Int : 158 ms          QRS Dur : 142 ms      QT Int : 448 ms       P-R-T Axes : 073 098 -04 degrees     QTc Int : 454 ms    Normal sinus rhythm  Rightward axis  Nonspecific intraventricular block  T wave abnormality, consider inferior ischemia  Abnormal ECG  When compared with ECG of 03-APR-2023 20:01,  T wave inversion now evident in Inferior leads  Confirmed by Kiel PHILLIPS MD ([id number]) on 4/13/2023 9:12:02 AM    Referred By: [email or web address]   SELF           Confirmed By:Kiel PHILLIPS MD                                  Imaging Results              X-Ray Chest AP Portable (Final result)  Result time 04/13/23 08:28:02      Final result by Nakul Marin MD (04/13/23 08:28:02)                   Impression:      No acute process identified on this limited single view.  No detrimental change when compared with 04/03/2023.      Electronically signed by: Nakul Marin MD  Date:    04/13/2023  Time:    08:28               Narrative:    EXAMINATION:  XR CHEST AP PORTABLE    CLINICAL HISTORY:  Provided history is "  Chest pain, unspecified".    TECHNIQUE:  One view of the chest.    COMPARISON:  04/03/2023.    FINDINGS:  Cardiac wires and radiopaque clothing overlies the chest limiting evaluation.  Cardiomediastinal silhouette is not enlarged.  Prominent perihilar interstitial lung markings.  No focal consolidation.  No sizable pleural effusion.  No pneumothorax.                                       Medications   ondansetron injection 4 mg (4 mg Intravenous Not Given 4/13/23 0720)   aluminum-magnesium hydroxide-simethicone 200-200-20 mg/5 mL suspension 30 mL (30 mLs Oral Given 4/13/23 0735)     And   LIDOcaine HCl 2% oral solution 15 mL (15 mLs Oral Given 4/13/23 0735)   aspirin tablet 325 mg (325 mg Oral Given 4/13/23 0713) "     Medical Decision Making:   History:   Old Medical Records: I decided to obtain old medical records.  Differential Diagnosis:   ACS  GERD  Peripheral neuropathy  Independently Interpreted Test(s):   I have ordered and independently interpreted EKG Reading(s) - see summary below       <> Summary of EKG Reading(s): Sinus rhythm with left bundle branch, unchanged from prior.  New ST depressions in inferior leads, not seen on EKG from 4/3/23 though similar to EKG from 6/25/2019.   Clinical Tests:   Lab Tests: Ordered and Reviewed  Radiological Study: Ordered and Reviewed  Medical Tests: Ordered and Reviewed  ED Management:  Patient is hemodynamically stable and nontoxic appearing.  EKG shows no ST elevations but has new ST depressions in inferior leads when compared to 4/3/23. History is also suspicious for GERD so GI cocktail will be given in addition to cardiac workup. CBC, CMP, troponin and BNP are all within normal limits. Repeat troponin is also within normal limits. CXR demonstrates no acute cardiopulmonary processes. Patient received 325mg Aspirin.  On reassessment, patient reports symptoms are worsening and that she now has left-sided chest pain while sitting at rest.  Patient admitted to Hospital Medicine for further evaluation and management of chest pain.                          Clinical Impression:   Final diagnoses:  [R07.9] Chest pain        ED Disposition Condition    Observation                 Shy Ruiz MD  Resident  04/13/23 [id number]

## 2023-04-13 NOTE — LETTER
April 14, 2023             [facility]  [address] [place]  [address]  Phone: [phone]  Fax: [phone] April 14, 2023     Patient: Elda Cain   YOB: 1945       To Whom It May Concern:    Elda Cain was admitted to the hospital on 4/13/2023  6:13 AM and discharged on 4/14/2023. She may return to work on 4/17/23 with no restrictions.    Sincerely,    Khushi Vazquez MD  Department of Hospital Medicine

## 2023-04-13 NOTE — NURSING
[place] [id number] to admit pt to tele box [id number]- gave tele tech mrn no. Tele box was brought to er and given to

## 2023-04-14 VITALS
TEMPERATURE: 97 F | OXYGEN SATURATION: 98 % | HEIGHT: 65 IN | SYSTOLIC BLOOD PRESSURE: 121 MMHG | WEIGHT: 140 LBS | RESPIRATION RATE: 16 BRPM | BODY MASS INDEX: 23.32 KG/M2 | HEART RATE: 63 BPM | DIASTOLIC BLOOD PRESSURE: 66 MMHG

## 2023-04-14 PROBLEM — E78.1 HYPERTRIGLYCERIDEMIA: Status: ACTIVE | Noted: 2023-04-14

## 2023-04-14 LAB
CHOLEST SERPL-MCNC: 176 MG/DL (ref 120–199)
CHOLEST/HDLC SERPL: 5.5 {RATIO} (ref 2–5)
CV STRESS BASE HR: 59 BPM
D DIMER PPP IA.FEU-MCNC: 0.41 MG/L FEU
DIASTOLIC BLOOD PRESSURE: 77 MMHG
EJECTION FRACTION- HIGH: 59 %
END DIASTOLIC INDEX-HIGH: 155 ML/M2
END DIASTOLIC INDEX-LOW: 91 ML/M2
END SYSTOLIC INDEX-HIGH: 78 ML/M2
END SYSTOLIC INDEX-LOW: 40 ML/M2
HDLC SERPL-MCNC: 32 MG/DL (ref 40–75)
HDLC SERPL: 18.2 % (ref 20–50)
LDLC SERPL CALC-MCNC: 100.8 MG/DL (ref 63–159)
NONHDLC SERPL-MCNC: 144 MG/DL
NUC REST DIASTOLIC VOLUME INDEX: 54
NUC REST EJECTION FRACTION: 71
NUC REST SYSTOLIC VOLUME INDEX: 16
NUC STRESS DIASTOLIC VOLUME INDEX: 48
NUC STRESS EJECTION FRACTION: 76 %
NUC STRESS SYSTOLIC VOLUME INDEX: 12
OHS CV CPX 1 MINUTE RECOVERY HEART RATE: 96 BPM
OHS CV CPX 85 PERCENT MAX PREDICTED HEART RATE MALE: 118
OHS CV CPX MAX PREDICTED HEART RATE: 138
OHS CV CPX PATIENT IS FEMALE: 1
OHS CV CPX PATIENT IS MALE: 0
OHS CV CPX PEAK DIASTOLIC BLOOD PRESSURE: 83 MMHG
OHS CV CPX PEAK HEAR RATE: 71 BPM
OHS CV CPX PEAK RATE PRESSURE PRODUCT: NORMAL
OHS CV CPX PEAK SYSTOLIC BLOOD PRESSURE: 155 MMHG
OHS CV CPX PERCENT MAX PREDICTED HEART RATE ACHIEVED: 51
OHS CV CPX RATE PRESSURE PRODUCT PRESENTING: 8732
RETIRED EF AND QEF - SEE NOTES: 47 %
SYSTOLIC BLOOD PRESSURE: 148 MMHG
TRIGL SERPL-MCNC: 216 MG/DL (ref 30–150)

## 2023-04-14 PROCEDURE — G0378 HOSPITAL OBSERVATION PER HR: HCPCS

## 2023-04-14 PROCEDURE — 80061 LIPID PANEL: CPT | Performed by: HOSPITALIST

## 2023-04-14 PROCEDURE — 99239 HOSP IP/OBS DSCHRG MGMT >30: CPT | Mod: ,,, | Performed by: HOSPITALIST

## 2023-04-14 PROCEDURE — 25000003 PHARM REV CODE 250: Performed by: HOSPITALIST

## 2023-04-14 PROCEDURE — 36415 COLL VENOUS BLD VENIPUNCTURE: CPT | Performed by: HOSPITALIST

## 2023-04-14 PROCEDURE — 85379 FIBRIN DEGRADATION QUANT: CPT | Performed by: HOSPITALIST

## 2023-04-14 PROCEDURE — 63600175 PHARM REV CODE 636 W HCPCS: Performed by: HOSPITALIST

## 2023-04-14 PROCEDURE — 99239 PR HOSPITAL DISCHARGE DAY,>30 MIN: ICD-10-PCS | Mod: ,,, | Performed by: HOSPITALIST

## 2023-04-14 RX ORDER — ATORVASTATIN CALCIUM 40 MG/1
40 TABLET, FILM COATED ORAL DAILY
Status: DISCONTINUED | OUTPATIENT
Start: 2023-04-15 | End: 2023-04-14 | Stop reason: HOSPADM

## 2023-04-14 RX ORDER — ATORVASTATIN CALCIUM 40 MG/1
40 TABLET, FILM COATED ORAL DAILY
Qty: 90 TABLET | Refills: 3 | Status: SHIPPED | OUTPATIENT
Start: 2023-04-15 | End: 2024-04-14

## 2023-04-14 RX ORDER — REGADENOSON 0.08 MG/ML
0.4 INJECTION, SOLUTION INTRAVENOUS ONCE
Status: COMPLETED | OUTPATIENT
Start: 2023-04-14 | End: 2023-04-14

## 2023-04-14 RX ORDER — CAFFEINE CITRATE 20 MG/ML
60 SOLUTION INTRAVENOUS ONCE
Status: COMPLETED | OUTPATIENT
Start: 2023-04-14 | End: 2023-04-14

## 2023-04-14 RX ADMIN — LEVOTHYROXINE SODIUM 75 MCG: 75 TABLET ORAL at 06:04

## 2023-04-14 RX ADMIN — AMLODIPINE BESYLATE 10 MG: 10 TABLET ORAL at 11:04

## 2023-04-14 RX ADMIN — ESCITALOPRAM OXALATE 10 MG: 10 TABLET ORAL at 11:04

## 2023-04-14 RX ADMIN — CAFFEINE CITRATE 60 MG: 20 INJECTION INTRAVENOUS at 08:04

## 2023-04-14 RX ADMIN — LOSARTAN POTASSIUM 100 MG: 50 TABLET, FILM COATED ORAL at 11:04

## 2023-04-14 RX ADMIN — REGADENOSON 0.4 MG: 0.08 INJECTION, SOLUTION INTRAVENOUS at 08:04

## 2023-04-14 RX ADMIN — ASPIRIN 81 MG: 81 TABLET, COATED ORAL at 11:04

## 2023-04-14 NOTE — PLAN OF CARE
[facility] - [facility] Stepdown  Discharge Assessment    Primary Care Provider: Krys Rivera MD     Discharge Assessment (most recent)       BRIEF DISCHARGE ASSESSMENT - 04/14/23 1527          Discharge Planning    Assessment Type Discharge Planning Brief Assessment     Resource/Environmental Concerns none     Support Systems Children     Equipment Currently Used at Home none     Current Living Arrangements home     Patient/Family Anticipates Transition to home     Patient/Family Anticipated Services at Transition none     DME Needed Upon Discharge  none     Discharge Plan A Home     Discharge Plan B Home with family        Physical Activity    On average, how many days per week do you engage in moderate to strenuous exercise (like a brisk walk)? 3 days     On average, how many minutes do you engage in exercise at this level? 30 min        Financial Resource Strain    How hard is it for you to pay for the very basics like food, housing, medical care, and heating? Not hard at all        Housing Stability    In the last 12 months, was there a time when you were not able to pay the mortgage or rent on time? No     In the last 12 months, how many places have you lived? 1     In the last 12 months, was there a time when you did not have a steady place to sleep or slept in a shelter (including now)? No        Transportation Needs    In the past 12 months, has lack of transportation kept you from medical appointments or from getting medications? No     In the past 12 months, has lack of transportation kept you from meetings, work, or from getting things needed for daily living? No        Food Insecurity    Within the past 12 months, you worried that your food would run out before you got the money to buy more. Never true     Within the past 12 months, the food you bought just didn't last and you didn't have money to get more. Never true        Stress    Do you feel stress - tense, restless, nervous, or anxious, or unable  to sleep at night because your mind is troubled all the time - these days? Rather much        Social Connections    In a typical week, how many times do you talk on the phone with family, friends, or neighbors? More than three times a week     How often do you get together with friends or relatives? More than three times a week     How often do you attend Congregational or Yazidism services? More than 4 times per year     Do you belong to any clubs or organizations such as Congregational groups, unions, fraternal or athletic groups, or school groups? Yes     How often do you attend meetings of the clubs or organizations you belong to? More than 4 times per year     Are you , , , , never , or living with a partner?         Alcohol Use    Q1: How often do you have a drink containing alcohol? Never     Q2: How many drinks containing alcohol do you have on a typical day when you are drinking? Patient does not drink     Q3: How often do you have six or more drinks on one occasion? Never                     CM met with patient at bedside. Given discharge booklet and contact pnone number on white board. Patient stated she lives alone in a one story house and 2 steps to port of entry. She does not have any DME and does not take coumadin nor on dialysis. She does not want bedside delivery. Patient verified PCP as Dr Gamboa and verified insurance and pharmacy as [facility] on [address] . Will continue to monitor for discharge needs.     Abundio Bonilla RN    [phone]

## 2023-04-14 NOTE — DISCHARGE SUMMARY
[facility] - Cardiology [facility] Medicine  Discharge Summary      Patient Name: Elda Cain  MRN: [id number]  MARGOT: [id number]  Patient Class: OP- Observation  Admission Date: 4/13/2023  Hospital Length of Stay: 1 days  Discharge Date and Time:  04/14/2023 4:37 PM  Attending Physician: Khushi Vazquez MD   Discharging Provider: Khushi Vazquez MD  Primary Care Provider: Krys Rivera MD  Hospital Medicine Team: [facility] MED C Khushi Vazquez MD  Primary Care Team: [facility] MED     HPI:   No notes on file    * No surgery found *      Hospital Course:   Admitted for chest pain & shortness of breath. CXR unremarkable. Troponins negative. Nuclear stress test negative. DDimer negative. Reviewed echo from OSH which was normal. Increased home statin to 40 mg for elevated triglycerides.        Goals of Care Treatment Preferences:  Code Status: Full Code      Consults:   Consults (From admission, onward)          Status Ordering Provider     Inpatient consult to Registered Dietitian/Nutritionist  Once        Provider:  (Not yet assigned)    KHUSHI Pérez new Assessment & Plan notes have been filed under this hospital service since the last note was generated.  Service: Hospital Medicine    Final Active Diagnoses:    Diagnosis Date Noted POA    PRINCIPAL PROBLEM:  Chest pain [R07.9] 04/13/2023 Unknown    Hypertriglyceridemia [E78.1] 04/14/2023 Yes    Depression with anxiety [F41.8] 03/20/2018 Yes    Hyperlipemia [E78.5]  Yes    Hypothyroidism [E03.9] 08/07/2017 Yes      Problems Resolved During this Admission:       Discharged Condition: good    Disposition:     Follow Up:    Patient Instructions:   No discharge procedures on file.    Significant Diagnostic Studies: Labs:   CMP   Recent Labs   Lab 04/13/23  0657      K 3.8      CO2 22*      BUN 18   CREATININE 0.9   CALCIUM 9.7   PROT 8.0   ALBUMIN 4.0   BILITOT 0.5   ALKPHOS 89   AST 19   ALT 16   ANIONGAP 10    and CBC    Recent Labs   Lab 04/13/23 0657   WBC 6.15   HGB 13.5   HCT 40.5          Pending Diagnostic Studies:       Procedure Component Value Units Date/Time    B-Type natriuretic peptide (BNP) [[id number]] Collected: 04/13/23 0657    Order Status: Sent Lab Status: In process Updated: 04/13/23 0658    Specimen: Blood            Medications:  Reconciled Home Medications:      Medication List        CHANGE how you take these medications      atorvastatin 40 MG tablet  Commonly known as: LIPITOR  Take 1 tablet (40 mg total) by mouth once daily.  Start taking on: April 15, 2023  What changed:   medication strength  how much to take            CONTINUE taking these medications      amLODIPine 10 MG tablet  Commonly known as: NORVASC  Take 10 mg by mouth once daily.     aspirin 81 MG EC tablet  Commonly known as: ECOTRIN  Take 81 mg by mouth once daily.     calcium-vitamin D3 500 mg-5 mcg (200 unit) per tablet  Commonly known as: OS-MECHE 500 + D3  Take 2 tablets by mouth every morning.     EScitalopram oxalate 10 MG tablet  Commonly known as: LEXAPRO  Take 10 mg by mouth once daily.     latanoprost 0.005 % ophthalmic solution  Place 1 drop into both eyes every evening.     levothyroxine 75 MCG tablet  Commonly known as: SYNTHROID  Take 75 mcg by mouth once daily.     losartan 100 MG tablet  Commonly known as: COZAAR  Take 100 mg by mouth once daily.     multivitamin capsule  Take 1 capsule by mouth once daily.     naproxen sodium 220 MG tablet  Commonly known as: ANAPROX  Take 440 mg by mouth daily as needed (SHOULDER PAIN).     omega-3 fatty acids 1,000 mg Cap  Take 2 capsules by mouth once daily.     RED YEAST RICE ORAL  Take 2 capsules by mouth every morning.     SHARPER FOCUS ORAL  Take 2 capsules by mouth every morning.     VITAMIN C ORAL  Take 1 capsule by mouth every morning.     ZINC WITH VITAMIN C ORAL  Take 1 tablet by mouth every morning.              Indwelling Lines/Drains at time of discharge:    Lines/Drains/Airways       None                   Time spent on the discharge of patient: 35 minutes         Khushi Vazquez MD  Department of Hospital Medicine  [facility] - Cardiology Stepdown

## 2023-04-14 NOTE — CONSULTS
Food & Nutrition  Education    Diet Education: Heart healthy diet education w/ label reading   Time Spent:  15 minutes   Learners: Pt       Nutrition Education provided with handouts:   Discussed heart healthy diet, low sodium, and label reading discussing cholesterol, saturated fats, and trans fat.       Comments:  Spoke w/ pt at bedside, reports being mindful of changes regarding diet. Pt reports typically consuming frozen foods suggestions provided for dietary changes. Handouts provided, pt w/ no additional questions for me today.      All questions and concerns answered. Dietitian's contact information provided.       Follow-Up: Yes     Please Re-consult as needed        Thanks!

## 2023-04-14 NOTE — HOSPITAL COURSE
Admitted for chest pain & shortness of breath. Troponins negative. Nuclear stress test negative. DDimer negative. Reviewed echo from OSH which was normal. Increased home statin to 40 mg for elevated triglycerides.

## 2023-04-15 NOTE — NURSING
Patient provided with discharge instructions and verbalizes understanding. Patient will  medications from preferred outside pharmacy. Patient verbalizes understanding of medications and next appointments. Peripheral IV removed.

## 2023-04-17 NOTE — PLAN OF CARE
04/17/23 0942   Final Note   Assessment Type Final Discharge Note   Anticipated Discharge Disposition Home   What phone number can be called within the next 1-3 days to see how you are doing after discharge? [phone]   Hospital Resources/Appts/Education Provided Provided patient/caregiver with written discharge plan information;Appointments scheduled and added to AVS;Appointments scheduled by Navigator/Coordinator   Post-Acute Status   Discharge Delays None known at this time       Patient discharged home / no needs. Called to follow up on appointment and Office was already sent a message to her physician to see if she can get in for hospital follow up sooner per patient . She originally was scheduled for 4/27/23.    Abundio Bonilla RN CM   [phone]

## 2023-11-09 NOTE — Clinical Note
"Elda Pham"Ant was seen and treated in our emergency department on 4/3/2023.  She may return to work on 04/05/2023.       If you have any questions or concerns, please don't hesitate to call.      Titus Bustillos, DO" [facility]

## 2023-12-01 ENCOUNTER — OFFICE VISIT (OUTPATIENT)
Dept: URGENT CARE | Facility: CLINIC | Age: 78
End: 2023-12-01
Payer: MEDICARE

## 2023-12-01 VITALS
WEIGHT: 140 LBS | TEMPERATURE: 98 F | DIASTOLIC BLOOD PRESSURE: 83 MMHG | RESPIRATION RATE: 17 BRPM | OXYGEN SATURATION: 97 % | HEART RATE: 70 BPM | BODY MASS INDEX: 23.32 KG/M2 | HEIGHT: 65 IN | SYSTOLIC BLOOD PRESSURE: 160 MMHG

## 2023-12-01 DIAGNOSIS — J22 ACUTE LOWER RESPIRATORY INFECTION: Primary | ICD-10-CM

## 2023-12-01 DIAGNOSIS — R05.1 ACUTE COUGH: ICD-10-CM

## 2023-12-01 PROCEDURE — 99214 OFFICE O/P EST MOD 30 MIN: CPT | Mod: S$GLB,,, | Performed by: FAMILY MEDICINE

## 2023-12-01 PROCEDURE — 99214 PR OFFICE/OUTPT VISIT, EST, LEVL IV, 30-39 MIN: ICD-10-PCS | Mod: S$GLB,,, | Performed by: FAMILY MEDICINE

## 2023-12-01 RX ORDER — PROMETHAZINE HYDROCHLORIDE AND DEXTROMETHORPHAN HYDROBROMIDE 6.25; 15 MG/5ML; MG/5ML
5 SYRUP ORAL EVERY 4 HOURS PRN
Qty: 118 ML | Refills: 0 | Status: SHIPPED | OUTPATIENT
Start: 2023-12-01 | End: 2023-12-11

## 2023-12-01 RX ORDER — DOXYCYCLINE HYCLATE 100 MG
100 TABLET ORAL 2 TIMES DAILY
Qty: 14 TABLET | Refills: 0 | Status: SHIPPED | OUTPATIENT
Start: 2023-12-01 | End: 2023-12-08

## 2023-12-01 RX ORDER — BENZONATATE 200 MG/1
200 CAPSULE ORAL 3 TIMES DAILY PRN
Qty: 30 CAPSULE | Refills: 0 | Status: SHIPPED | OUTPATIENT
Start: 2023-12-01 | End: 2023-12-11

## 2023-12-02 NOTE — PROGRESS NOTES
"Subjective:      Patient ID: Elda Cain is a 77 y.o. female.    Vitals:  height is 5' 5" (1.651 m) and weight is 63.5 kg (140 lb). Her oral temperature is 98.3 °F (36.8 °C). Her blood pressure is 160/83 (abnormal) and her pulse is 70. Her respiration is 17 and oxygen saturation is 97%.     Chief Complaint: Cough    Pt states her symptoms started Sunday night. Pt states she have a cough and congestion. Pt states she have some thick phlegm. Pt states she woke up this morning with hoarseness. Pt states she received her COVID and FLU shot a few weeks ago. Pt states she doesn't have a good appetite. Pt states she have been keeping up her fluids and eating soup. Pt states she have taken several OTC medication with no relief.     Cough  This is a new problem. The current episode started in the past 7 days. The problem has been unchanged. The problem occurs constantly. The cough is Productive of sputum. Associated symptoms include ear pain, nasal congestion and postnasal drip. Pertinent negatives include no chest pain, chills, ear congestion, fever, headaches, heartburn, hemoptysis, myalgias, rash, rhinorrhea, sore throat, shortness of breath, sweats, weight loss or wheezing. She has tried OTC cough suppressant for the symptoms. The treatment provided no relief.       Constitution: Negative for chills and fever.   HENT:  Positive for ear pain and postnasal drip. Negative for sore throat.    Cardiovascular:  Negative for chest pain.   Respiratory:  Positive for cough. Negative for bloody sputum, shortness of breath and wheezing.    Gastrointestinal:  Negative for heartburn.   Musculoskeletal:  Negative for muscle ache.   Skin:  Negative for rash.   Neurological:  Negative for headaches.      Objective:     Physical Exam  Constitutional: Pt oriented to person, place, and time.  Non-toxic appearance.   Patient does not appear ill. No distress. normal  HENT: No icterus or facial swelling appreciated  Head: Normocephalic " and atraumatic.   Nose:+ congestion.   Pulmonary/Chest: Effort normal. No stridor. No respiratory distress.   Abdominal: Normal appearance. Abdomen exhibits no distension.   Musculoskeletal:         General: No swelling.   Neurological: no focal deficit. Patient is alert and oriented to person, place, and time.   Skin: Skin is not diaphoretic and not pale. no jaundice  Psychiatric: Patients behavior is normal. Mood, judgment and thought content normal.     Assessment:     1. Acute lower respiratory infection    2. Acute cough        Plan:       Acute lower respiratory infection  -     doxycycline (VIBRA-TABS) 100 MG tablet; Take 1 tablet (100 mg total) by mouth 2 (two) times daily. for 7 days  Dispense: 14 tablet; Refill: 0    Acute cough  -     benzonatate (TESSALON) 200 MG capsule; Take 1 capsule (200 mg total) by mouth 3 (three) times daily as needed for Cough.  Dispense: 30 capsule; Refill: 0  -     promethazine-dextromethorphan (PROMETHAZINE-DM) 6.25-15 mg/5 mL Syrp; Take 5 mLs by mouth every 4 (four) hours as needed.  Dispense: 118 mL; Refill: 0

## 2023-12-02 NOTE — PATIENT INSTRUCTIONS
Rest and hydrate and you can use the Tessalon Perles during the day as a cough suppressant use the syrup at night.  The syrup can make you feel little sleepy so I do not recommend using this when you have to work.      9 times out of 10 these are viral syndromes however if symptoms not improving the next few days you can always add the antibiotic that I wrote for called doxycycline it does treat bacterial bronchitis or even community-acquired pneumonia.      If you have any significant worsening or worrisome symptoms such as chest pain or trouble breathing or for any other worrisome concerned I do recommend that you go to the ER immediately for further evaluation and management.